# Patient Record
Sex: FEMALE | Race: WHITE | NOT HISPANIC OR LATINO
[De-identification: names, ages, dates, MRNs, and addresses within clinical notes are randomized per-mention and may not be internally consistent; named-entity substitution may affect disease eponyms.]

---

## 2019-07-12 ENCOUNTER — APPOINTMENT (OUTPATIENT)
Dept: INTERNAL MEDICINE | Facility: CLINIC | Age: 74
End: 2019-07-12
Payer: MEDICARE

## 2019-07-12 ENCOUNTER — FORM ENCOUNTER (OUTPATIENT)
Age: 74
End: 2019-07-12

## 2019-07-12 ENCOUNTER — NON-APPOINTMENT (OUTPATIENT)
Age: 74
End: 2019-07-12

## 2019-07-12 VITALS
SYSTOLIC BLOOD PRESSURE: 132 MMHG | TEMPERATURE: 97.8 F | OXYGEN SATURATION: 98 % | DIASTOLIC BLOOD PRESSURE: 78 MMHG | HEIGHT: 63 IN | BODY MASS INDEX: 17.36 KG/M2 | WEIGHT: 98 LBS | HEART RATE: 98 BPM

## 2019-07-12 DIAGNOSIS — Z78.9 OTHER SPECIFIED HEALTH STATUS: ICD-10-CM

## 2019-07-12 DIAGNOSIS — G89.29 EPIGASTRIC PAIN: ICD-10-CM

## 2019-07-12 DIAGNOSIS — Z80.3 FAMILY HISTORY OF MALIGNANT NEOPLASM OF BREAST: ICD-10-CM

## 2019-07-12 DIAGNOSIS — Z86.79 PERSONAL HISTORY OF OTHER DISEASES OF THE CIRCULATORY SYSTEM: ICD-10-CM

## 2019-07-12 DIAGNOSIS — Z98.890 OTHER SPECIFIED POSTPROCEDURAL STATES: ICD-10-CM

## 2019-07-12 DIAGNOSIS — R10.13 EPIGASTRIC PAIN: ICD-10-CM

## 2019-07-12 DIAGNOSIS — L71.9 ROSACEA, UNSPECIFIED: ICD-10-CM

## 2019-07-12 DIAGNOSIS — Z87.898 PERSONAL HISTORY OF OTHER SPECIFIED CONDITIONS: ICD-10-CM

## 2019-07-12 DIAGNOSIS — Z92.89 PERSONAL HISTORY OF OTHER MEDICAL TREATMENT: ICD-10-CM

## 2019-07-12 PROCEDURE — 93000 ELECTROCARDIOGRAM COMPLETE: CPT

## 2019-07-12 PROCEDURE — G0439: CPT

## 2019-07-12 PROCEDURE — G0444 DEPRESSION SCREEN ANNUAL: CPT | Mod: 59

## 2019-07-12 RX ORDER — CA/D3/MAG OX/ZINC/COP/MANG/BOR 600 MG-800
250 MCG TABLET,CHEWABLE ORAL
Refills: 0 | Status: ACTIVE | COMMUNITY

## 2019-07-12 RX ORDER — BIOTIN 10 MG
10000 TABLET ORAL DAILY
Refills: 0 | Status: ACTIVE | COMMUNITY

## 2019-07-12 RX ORDER — LUTEIN 6 MG
TABLET ORAL
Refills: 0 | Status: ACTIVE | COMMUNITY

## 2019-07-12 NOTE — HISTORY OF PRESENT ILLNESS
[de-identified] : annual exam\par - previous PMD resigned b/c of medical reasons\par \par HLD\par - compliant w/ medications\par - no side effects\par \par Chronic epigastric pain\par - non raditing, no association w/ food\par - no N/V\par - a/w loss of appetite and weight loss\par - can only tolerate liquids and jello\par - never had EGD\par \par acute constipation\par - no BM for the past 4 days\par - a/w nausea and loss of appetite\par - a/w change in stool caliber and size.\par \par HCM\par - up to date. [FreeTextEntry1] : annual exam

## 2019-07-12 NOTE — REVIEW OF SYSTEMS
[Abdominal Pain] : abdominal pain [Nausea] : nausea [Constipation] : constipation [Vomiting] : no vomiting [Negative] : Psychiatric

## 2019-07-12 NOTE — PHYSICAL EXAM
[No Acute Distress] : no acute distress [Well Nourished] : well nourished [Well Developed] : well developed [Well-Appearing] : well-appearing [Normal Sclera/Conjunctiva] : normal sclera/conjunctiva [EOMI] : extraocular movements intact [Normal Outer Ear/Nose] : the outer ears and nose were normal in appearance [Supple] : supple [No Lymphadenopathy] : no lymphadenopathy [Thyroid Normal, No Nodules] : the thyroid was normal and there were no nodules present [No Respiratory Distress] : no respiratory distress  [No Accessory Muscle Use] : no accessory muscle use [Clear to Auscultation] : lungs were clear to auscultation bilaterally [Normal Rate] : normal rate  [Regular Rhythm] : with a regular rhythm [Normal S1, S2] : normal S1 and S2 [No Murmur] : no murmur heard [No Varicosities] : no varicosities [No Edema] : there was no peripheral edema [No Palpable Aorta] : no palpable aorta [Non Tender] : non-tender [Soft] : abdomen soft [Non-distended] : non-distended [No Masses] : no abdominal mass palpated [No HSM] : no HSM [Normal Posterior Cervical Nodes] : no posterior cervical lymphadenopathy [Normal Anterior Cervical Nodes] : no anterior cervical lymphadenopathy [No CVA Tenderness] : no CVA  tenderness [No Joint Swelling] : no joint swelling [No Rash] : no rash [Coordination Grossly Intact] : coordination grossly intact [No Focal Deficits] : no focal deficits [Normal Gait] : normal gait [Normal Affect] : the affect was normal [Alert and Oriented x3] : oriented to person, place, and time [Normal Mood] : the mood was normal [Normal Insight/Judgement] : insight and judgment were intact

## 2019-07-12 NOTE — HEALTH RISK ASSESSMENT
[Excellent] : ~his/her~  mood as  excellent [] : No [Yes] : Yes [0] : 2) Feeling down, depressed, or hopeless: Not at all (0) [Change in mental status noted] : No change in mental status noted [[id number]] : 0 [Language] : denies difficulty with language [Behavior] : denies difficulty with behavior [Learning/Retaining New Information] : denies difficulty learning/retaining new information [Handling Complex Tasks] : denies difficulty handling complex tasks [Reasoning] : denies difficulty with reasoning [Spatial Ability and Orientation] : denies difficulty with spatial ability and orientation [None] : None [Alone] : lives alone [Single] : single [Sexually Active] : not sexually active [Fully functional (using the telephone, shopping, preparing meals, housekeeping, doing laundry, using] : Fully functional and needs no help or supervision to perform IADLs (using the telephone, shopping, preparing meals, housekeeping, doing laundry, using transportation, managing medications and managing finances) [Fully functional (bathing, dressing, toileting, transferring, walking, feeding)] : Fully functional (bathing, dressing, toileting, transferring, walking, feeding) [Reports changes in hearing] : Reports no changes in hearing [Reports changes in vision] : Reports no changes in vision [With Patient/Caregiver] : With Patient/Caregiver [AdvancecareDate] : 07/12/2019

## 2019-07-13 ENCOUNTER — APPOINTMENT (OUTPATIENT)
Dept: CT IMAGING | Facility: HOSPITAL | Age: 74
End: 2019-07-13
Payer: MEDICARE

## 2019-07-13 ENCOUNTER — OUTPATIENT (OUTPATIENT)
Dept: OUTPATIENT SERVICES | Facility: HOSPITAL | Age: 74
LOS: 1 days | End: 2019-07-13
Payer: MEDICARE

## 2019-07-13 ENCOUNTER — TRANSCRIPTION ENCOUNTER (OUTPATIENT)
Age: 74
End: 2019-07-13

## 2019-07-13 PROCEDURE — 74177 CT ABD & PELVIS W/CONTRAST: CPT | Mod: 26

## 2019-07-13 PROCEDURE — 74177 CT ABD & PELVIS W/CONTRAST: CPT

## 2019-07-16 LAB
25(OH)D3 SERPL-MCNC: 37.9 NG/ML
ALBUMIN SERPL ELPH-MCNC: 4.2 G/DL
ALP BLD-CCNC: 71 U/L
ALT SERPL-CCNC: 11 U/L
AMYLASE/CREAT SERPL: 42 U/L
ANION GAP SERPL CALC-SCNC: 17 MMOL/L
APPEARANCE: CLEAR
AST SERPL-CCNC: 13 U/L
BASOPHILS # BLD AUTO: 0.09 K/UL
BASOPHILS NFR BLD AUTO: 1.2 %
BILIRUB SERPL-MCNC: 0.4 MG/DL
BILIRUBIN URINE: NEGATIVE
BLOOD URINE: NEGATIVE
BUN SERPL-MCNC: 8 MG/DL
CALCIUM SERPL-MCNC: 9.8 MG/DL
CHLORIDE SERPL-SCNC: 99 MMOL/L
CHOLEST SERPL-MCNC: 104 MG/DL
CHOLEST/HDLC SERPL: 2.5 RATIO
CO2 SERPL-SCNC: 23 MMOL/L
COLOR: YELLOW
CREAT SERPL-MCNC: 0.57 MG/DL
CRP SERPL-MCNC: 2.3 MG/DL
EOSINOPHIL # BLD AUTO: 0.08 K/UL
EOSINOPHIL NFR BLD AUTO: 1 %
ESTIMATED AVERAGE GLUCOSE: 117 MG/DL
GLUCOSE QUALITATIVE U: NEGATIVE
GLUCOSE SERPL-MCNC: 94 MG/DL
HBA1C MFR BLD HPLC: 5.7 %
HCT VFR BLD CALC: 39.2 %
HCV AB SER QL: NONREACTIVE
HCV S/CO RATIO: 0.16 S/CO
HDLC SERPL-MCNC: 42 MG/DL
HGB BLD-MCNC: 12 G/DL
IMM GRANULOCYTES NFR BLD AUTO: 0.4 %
KETONES URINE: ABNORMAL
LDLC SERPL CALC-MCNC: 47 MG/DL
LEUKOCYTE ESTERASE URINE: NEGATIVE
LPL SERPL-CCNC: 18 U/L
LYMPHOCYTES # BLD AUTO: 1.28 K/UL
LYMPHOCYTES NFR BLD AUTO: 16.7 %
MAN DIFF?: NORMAL
MCHC RBC-ENTMCNC: 28.5 PG
MCHC RBC-ENTMCNC: 30.6 GM/DL
MCV RBC AUTO: 93.1 FL
MONOCYTES # BLD AUTO: 0.85 K/UL
MONOCYTES NFR BLD AUTO: 11.1 %
NEUTROPHILS # BLD AUTO: 5.33 K/UL
NEUTROPHILS NFR BLD AUTO: 69.6 %
NITRITE URINE: NEGATIVE
PH URINE: 6.5
PLATELET # BLD AUTO: 387 K/UL
POTASSIUM SERPL-SCNC: 4.4 MMOL/L
PROT SERPL-MCNC: 6.5 G/DL
PROTEIN URINE: NORMAL
RBC # BLD: 4.21 M/UL
RBC # FLD: 12.8 %
SODIUM SERPL-SCNC: 139 MMOL/L
SPECIFIC GRAVITY URINE: 1.02
T4 FREE SERPL-MCNC: 1.8 NG/DL
TRIGL SERPL-MCNC: 75 MG/DL
TSH SERPL-ACNC: 1.89 UIU/ML
UROBILINOGEN URINE: NORMAL
WBC # FLD AUTO: 7.66 K/UL

## 2019-10-23 ENCOUNTER — APPOINTMENT (OUTPATIENT)
Dept: INTERNAL MEDICINE | Facility: CLINIC | Age: 74
End: 2019-10-23
Payer: MEDICARE

## 2019-10-23 VITALS
TEMPERATURE: 97.4 F | HEIGHT: 63 IN | BODY MASS INDEX: 18.61 KG/M2 | SYSTOLIC BLOOD PRESSURE: 130 MMHG | DIASTOLIC BLOOD PRESSURE: 76 MMHG | HEART RATE: 94 BPM | WEIGHT: 105 LBS | OXYGEN SATURATION: 100 %

## 2019-10-23 DIAGNOSIS — Z23 ENCOUNTER FOR IMMUNIZATION: ICD-10-CM

## 2019-10-23 PROCEDURE — 90662 IIV NO PRSV INCREASED AG IM: CPT

## 2019-10-23 PROCEDURE — G0008: CPT

## 2019-11-01 ENCOUNTER — FORM ENCOUNTER (OUTPATIENT)
Age: 74
End: 2019-11-01

## 2019-11-02 ENCOUNTER — OUTPATIENT (OUTPATIENT)
Dept: OUTPATIENT SERVICES | Facility: HOSPITAL | Age: 74
LOS: 1 days | End: 2019-11-02
Payer: MEDICARE

## 2019-11-02 ENCOUNTER — APPOINTMENT (OUTPATIENT)
Dept: CT IMAGING | Facility: HOSPITAL | Age: 74
End: 2019-11-02
Payer: MEDICARE

## 2019-11-02 PROCEDURE — 74177 CT ABD & PELVIS W/CONTRAST: CPT | Mod: 26

## 2019-11-02 PROCEDURE — 74177 CT ABD & PELVIS W/CONTRAST: CPT

## 2019-12-30 ENCOUNTER — APPOINTMENT (OUTPATIENT)
Dept: OBGYN | Facility: CLINIC | Age: 74
End: 2019-12-30
Payer: MEDICARE

## 2019-12-30 VITALS
WEIGHT: 105 LBS | DIASTOLIC BLOOD PRESSURE: 80 MMHG | SYSTOLIC BLOOD PRESSURE: 140 MMHG | BODY MASS INDEX: 18.61 KG/M2 | HEIGHT: 63 IN

## 2019-12-30 PROCEDURE — 36415 COLL VENOUS BLD VENIPUNCTURE: CPT

## 2019-12-30 PROCEDURE — G0101: CPT

## 2019-12-30 NOTE — HISTORY OF PRESENT ILLNESS
[Currently In Menopause] : currently in menopause [Experiencing Menopausal Sxs] : experiencing menopausal symptoms [1 Year Ago] : 1 year ago [Good] : being in good health [Healthy Diet] : a healthy diet [Regular Exercise] : regular exercise [Weight Concerns] : no concerns with her weight [Menstrual Problems] : reports normal menses [Up to Date] : up to date with ~his/her~ STD screening

## 2019-12-30 NOTE — PHYSICAL EXAM
[Normal] : vagina [No Bleeding] : there was no active vaginal bleeding [Uterine Adnexae] : were not tender and not enlarged None known

## 2019-12-31 LAB
BASOPHILS # BLD AUTO: 0.05 K/UL
BASOPHILS NFR BLD AUTO: 0.7 %
CANCER AG125 SERPL-ACNC: 6 U/ML
EOSINOPHIL # BLD AUTO: 0.05 K/UL
EOSINOPHIL NFR BLD AUTO: 0.7 %
HCT VFR BLD CALC: 38.4 %
HGB BLD-MCNC: 11.9 G/DL
IMM GRANULOCYTES NFR BLD AUTO: 0.7 %
LYMPHOCYTES # BLD AUTO: 1.29 K/UL
LYMPHOCYTES NFR BLD AUTO: 19 %
MAN DIFF?: NORMAL
MCHC RBC-ENTMCNC: 28.6 PG
MCHC RBC-ENTMCNC: 31 GM/DL
MCV RBC AUTO: 92.3 FL
MONOCYTES # BLD AUTO: 0.55 K/UL
MONOCYTES NFR BLD AUTO: 8.1 %
NEUTROPHILS # BLD AUTO: 4.81 K/UL
NEUTROPHILS NFR BLD AUTO: 70.8 %
PLATELET # BLD AUTO: 324 K/UL
RBC # BLD: 4.16 M/UL
RBC # FLD: 13.8 %
WBC # FLD AUTO: 6.8 K/UL

## 2020-01-02 LAB — CYTOLOGY CVX/VAG DOC THIN PREP: ABNORMAL

## 2020-01-10 ENCOUNTER — RX RENEWAL (OUTPATIENT)
Age: 75
End: 2020-01-10

## 2020-08-14 ENCOUNTER — APPOINTMENT (OUTPATIENT)
Dept: INTERNAL MEDICINE | Facility: CLINIC | Age: 75
End: 2020-08-14
Payer: MEDICARE

## 2020-08-14 VITALS
HEART RATE: 92 BPM | TEMPERATURE: 98.2 F | OXYGEN SATURATION: 98 % | WEIGHT: 105 LBS | DIASTOLIC BLOOD PRESSURE: 60 MMHG | SYSTOLIC BLOOD PRESSURE: 120 MMHG | BODY MASS INDEX: 18.61 KG/M2 | HEIGHT: 63 IN

## 2020-08-14 DIAGNOSIS — Z11.59 ENCOUNTER FOR SCREENING FOR OTHER VIRAL DISEASES: ICD-10-CM

## 2020-08-14 DIAGNOSIS — Z23 ENCOUNTER FOR IMMUNIZATION: ICD-10-CM

## 2020-08-14 PROCEDURE — G0009: CPT

## 2020-08-14 PROCEDURE — G0444 DEPRESSION SCREEN ANNUAL: CPT | Mod: 59

## 2020-08-14 PROCEDURE — G0439: CPT

## 2020-08-14 PROCEDURE — 36415 COLL VENOUS BLD VENIPUNCTURE: CPT

## 2020-08-14 PROCEDURE — 90670 PCV13 VACCINE IM: CPT

## 2020-08-14 RX ORDER — ATORVASTATIN CALCIUM 10 MG/1
10 TABLET, FILM COATED ORAL
Qty: 90 | Refills: 3 | Status: COMPLETED | COMMUNITY
End: 2020-08-14

## 2020-08-14 NOTE — HISTORY OF PRESENT ILLNESS
[de-identified] : annual\par - doing well over all\par - has not left house much b/c of covid\par - has had a few panic attacks, pandemic has made her very anxious\par \par HLD\par - compliant w/ medication\par \par HCM\par - needs pna13 vax\par - missed mammo b/c of pandemic [FreeTextEntry1] : annual

## 2020-08-14 NOTE — HEALTH RISK ASSESSMENT
[None] : None [With Family] : lives with family [Employed] : employed [Single] : single [Fully functional (using the telephone, shopping, preparing meals, housekeeping, doing laundry, using] : Fully functional and needs no help or supervision to perform IADLs (using the telephone, shopping, preparing meals, housekeeping, doing laundry, using transportation, managing medications and managing finances) [Fully functional (bathing, dressing, toileting, transferring, walking, feeding)] : Fully functional (bathing, dressing, toileting, transferring, walking, feeding)

## 2020-08-14 NOTE — PHYSICAL EXAM
[Well Nourished] : well nourished [No Acute Distress] : no acute distress [Well Developed] : well developed [Normal Sclera/Conjunctiva] : normal sclera/conjunctiva [Well-Appearing] : well-appearing [EOMI] : extraocular movements intact [Normal Outer Ear/Nose] : the outer ears and nose were normal in appearance [Supple] : supple [No Lymphadenopathy] : no lymphadenopathy [Thyroid Normal, No Nodules] : the thyroid was normal and there were no nodules present [No Respiratory Distress] : no respiratory distress  [No Accessory Muscle Use] : no accessory muscle use [Normal Rate] : normal rate  [Clear to Auscultation] : lungs were clear to auscultation bilaterally [Regular Rhythm] : with a regular rhythm [No Murmur] : no murmur heard [Normal S1, S2] : normal S1 and S2 [No Varicosities] : no varicosities [No Palpable Aorta] : no palpable aorta [Soft] : abdomen soft [Non Tender] : non-tender [Non-distended] : non-distended [No HSM] : no HSM [No Masses] : no abdominal mass palpated [Normal Supraclavicular Nodes] : no supraclavicular lymphadenopathy [Normal Anterior Cervical Nodes] : no anterior cervical lymphadenopathy [No CVA Tenderness] : no CVA  tenderness [No Spinal Tenderness] : no spinal tenderness [No Rash] : no rash [Grossly Normal Strength/Tone] : grossly normal strength/tone [No Joint Swelling] : no joint swelling [No Focal Deficits] : no focal deficits [Coordination Grossly Intact] : coordination grossly intact [Normal Gait] : normal gait [Normal Affect] : the affect was normal [Alert and Oriented x3] : oriented to person, place, and time [Normal Mood] : the mood was normal [Normal Insight/Judgement] : insight and judgment were intact [de-identified] : b/l LE pedal edema

## 2020-08-19 ENCOUNTER — TRANSCRIPTION ENCOUNTER (OUTPATIENT)
Age: 75
End: 2020-08-19

## 2020-08-19 LAB
25(OH)D3 SERPL-MCNC: 40.5 NG/ML
ALBUMIN SERPL ELPH-MCNC: 5.1 G/DL
ALP BLD-CCNC: 68 U/L
ALT SERPL-CCNC: 22 U/L
ANION GAP SERPL CALC-SCNC: 13 MMOL/L
APPEARANCE: CLEAR
AST SERPL-CCNC: 22 U/L
BASOPHILS # BLD AUTO: 0.06 K/UL
BASOPHILS NFR BLD AUTO: 1.1 %
BILIRUB SERPL-MCNC: 0.6 MG/DL
BILIRUBIN URINE: NEGATIVE
BLOOD URINE: NEGATIVE
BUN SERPL-MCNC: 13 MG/DL
CALCIUM SERPL-MCNC: 10.2 MG/DL
CHLORIDE SERPL-SCNC: 105 MMOL/L
CHOLEST SERPL-MCNC: 148 MG/DL
CHOLEST/HDLC SERPL: 2.1 RATIO
CO2 SERPL-SCNC: 24 MMOL/L
COLOR: NORMAL
CREAT SERPL-MCNC: 0.58 MG/DL
EOSINOPHIL # BLD AUTO: 0.04 K/UL
EOSINOPHIL NFR BLD AUTO: 0.7 %
ESTIMATED AVERAGE GLUCOSE: 108 MG/DL
GLUCOSE QUALITATIVE U: NEGATIVE
GLUCOSE SERPL-MCNC: 101 MG/DL
HBA1C MFR BLD HPLC: 5.4 %
HCT VFR BLD CALC: 38.7 %
HDLC SERPL-MCNC: 71 MG/DL
HGB BLD-MCNC: 12.4 G/DL
IMM GRANULOCYTES NFR BLD AUTO: 0.4 %
KETONES URINE: NEGATIVE
LDLC SERPL CALC-MCNC: 67 MG/DL
LEUKOCYTE ESTERASE URINE: NEGATIVE
LYMPHOCYTES # BLD AUTO: 1.36 K/UL
LYMPHOCYTES NFR BLD AUTO: 24 %
MAN DIFF?: NORMAL
MCHC RBC-ENTMCNC: 29.9 PG
MCHC RBC-ENTMCNC: 32 GM/DL
MCV RBC AUTO: 93.3 FL
MONOCYTES # BLD AUTO: 0.31 K/UL
MONOCYTES NFR BLD AUTO: 5.5 %
NEUTROPHILS # BLD AUTO: 3.88 K/UL
NEUTROPHILS NFR BLD AUTO: 68.3 %
NITRITE URINE: NEGATIVE
PH URINE: 6.5
PLATELET # BLD AUTO: 212 K/UL
POTASSIUM SERPL-SCNC: 4.4 MMOL/L
PROT SERPL-MCNC: 7.3 G/DL
PROTEIN URINE: NEGATIVE
RBC # BLD: 4.15 M/UL
RBC # FLD: 12.9 %
SARS-COV-2 IGG SERPL IA-ACNC: 0.08 INDEX
SARS-COV-2 IGG SERPL QL IA: NEGATIVE
SODIUM SERPL-SCNC: 142 MMOL/L
SPECIFIC GRAVITY URINE: 1.01
T4 FREE SERPL-MCNC: 1.4 NG/DL
TRIGL SERPL-MCNC: 53 MG/DL
TSH SERPL-ACNC: 2 UIU/ML
UROBILINOGEN URINE: NORMAL
WBC # FLD AUTO: 5.67 K/UL

## 2020-09-11 ENCOUNTER — APPOINTMENT (OUTPATIENT)
Dept: INTERNAL MEDICINE | Facility: CLINIC | Age: 75
End: 2020-09-11
Payer: MEDICARE

## 2020-09-11 VITALS
OXYGEN SATURATION: 100 % | SYSTOLIC BLOOD PRESSURE: 135 MMHG | TEMPERATURE: 97.5 F | DIASTOLIC BLOOD PRESSURE: 72 MMHG | HEART RATE: 93 BPM

## 2020-09-11 DIAGNOSIS — Z23 ENCOUNTER FOR IMMUNIZATION: ICD-10-CM

## 2020-09-11 PROCEDURE — G0008: CPT

## 2020-09-11 PROCEDURE — 90662 IIV NO PRSV INCREASED AG IM: CPT

## 2021-05-04 ENCOUNTER — NON-APPOINTMENT (OUTPATIENT)
Age: 76
End: 2021-05-04

## 2021-05-04 ENCOUNTER — APPOINTMENT (OUTPATIENT)
Dept: INTERNAL MEDICINE | Facility: CLINIC | Age: 76
End: 2021-05-04
Payer: MEDICARE

## 2021-05-04 VITALS
HEIGHT: 65 IN | HEART RATE: 87 BPM | OXYGEN SATURATION: 97 % | WEIGHT: 112 LBS | DIASTOLIC BLOOD PRESSURE: 77 MMHG | BODY MASS INDEX: 18.66 KG/M2 | TEMPERATURE: 97.8 F | SYSTOLIC BLOOD PRESSURE: 152 MMHG

## 2021-05-04 DIAGNOSIS — R59.0 LOCALIZED ENLARGED LYMPH NODES: ICD-10-CM

## 2021-05-04 PROCEDURE — 99213 OFFICE O/P EST LOW 20 MIN: CPT

## 2021-05-04 NOTE — HISTORY OF PRESENT ILLNESS
[FreeTextEntry1] : follow up [de-identified] : follow up\par - doing well over all\par - completed covid vax sereies\par \par HTN\par - BP elevated\par - today is first day in public space since pandemic started.\par - she is very anxious. \par \par HCM\par - did not have mammo recently b/c of pandemic\par - will f/u w/ GYN

## 2021-07-28 ENCOUNTER — RX RENEWAL (OUTPATIENT)
Age: 76
End: 2021-07-28

## 2021-10-21 ENCOUNTER — RX RENEWAL (OUTPATIENT)
Age: 76
End: 2021-10-21

## 2021-12-07 ENCOUNTER — NON-APPOINTMENT (OUTPATIENT)
Age: 76
End: 2021-12-07

## 2021-12-07 ENCOUNTER — APPOINTMENT (OUTPATIENT)
Dept: OBGYN | Facility: CLINIC | Age: 76
End: 2021-12-07
Payer: MEDICARE

## 2021-12-07 VITALS — SYSTOLIC BLOOD PRESSURE: 140 MMHG | WEIGHT: 109 LBS | DIASTOLIC BLOOD PRESSURE: 80 MMHG | BODY MASS INDEX: 18.14 KG/M2

## 2021-12-07 PROCEDURE — G0101: CPT

## 2021-12-07 NOTE — HISTORY OF PRESENT ILLNESS
[Patient reported mammogram was normal] : Patient reported mammogram was normal [Patient reported PAP Smear was normal] : Patient reported PAP Smear was normal [Currently In Menopause] : currently in menopause [Post-Menopause, No Sxs] : post-menopausal, currently without symptoms [Mammogramdate] : 12/18/2018 [PapSmeardate] : 12/30/2019 [ColonoscopyDate] : 3/13/2017 [Previously active] : previously active

## 2021-12-09 LAB — CYTOLOGY CVX/VAG DOC THIN PREP: ABNORMAL

## 2022-02-02 ENCOUNTER — APPOINTMENT (OUTPATIENT)
Dept: INTERNAL MEDICINE | Facility: CLINIC | Age: 77
End: 2022-02-02
Payer: MEDICARE

## 2022-02-02 ENCOUNTER — NON-APPOINTMENT (OUTPATIENT)
Age: 77
End: 2022-02-02

## 2022-02-02 VITALS
TEMPERATURE: 97.6 F | SYSTOLIC BLOOD PRESSURE: 149 MMHG | DIASTOLIC BLOOD PRESSURE: 71 MMHG | HEART RATE: 99 BPM | BODY MASS INDEX: 18.64 KG/M2 | WEIGHT: 112 LBS | OXYGEN SATURATION: 99 %

## 2022-02-02 DIAGNOSIS — R21 RASH AND OTHER NONSPECIFIC SKIN ERUPTION: ICD-10-CM

## 2022-02-02 DIAGNOSIS — H26.9 UNSPECIFIED CATARACT: ICD-10-CM

## 2022-02-02 PROCEDURE — 93000 ELECTROCARDIOGRAM COMPLETE: CPT

## 2022-02-02 PROCEDURE — 36415 COLL VENOUS BLD VENIPUNCTURE: CPT

## 2022-02-02 PROCEDURE — G0439: CPT

## 2022-02-02 NOTE — PHYSICAL EXAM
[Normal] : affect was normal and insight and judgment were intact [de-identified] : left anterior shin flat macular red lesion, 2inch irregular boarder

## 2022-02-02 NOTE — HISTORY OF PRESENT ILLNESS
[FreeTextEntry1] : annual exam [de-identified] : annual\par - doing well over all\par \par h/o MVP\par - has not seen cardio in years\par \par left shin rash\par - present for 2 weeks\par - not painful or pruritic\par - would like derm eval\par - does not see one for rosacea\par \par HCM\par - mammo due this week\par - c scope due this year

## 2022-02-09 ENCOUNTER — TRANSCRIPTION ENCOUNTER (OUTPATIENT)
Age: 77
End: 2022-02-09

## 2022-02-09 LAB
25(OH)D3 SERPL-MCNC: 37.7 NG/ML
ALBUMIN SERPL ELPH-MCNC: 5 G/DL
ALP BLD-CCNC: 69 U/L
ALT SERPL-CCNC: 19 U/L
ANION GAP SERPL CALC-SCNC: 14 MMOL/L
APPEARANCE: CLEAR
AST SERPL-CCNC: 19 U/L
BASOPHILS # BLD AUTO: 0.06 K/UL
BASOPHILS NFR BLD AUTO: 0.8 %
BILIRUB SERPL-MCNC: 0.6 MG/DL
BILIRUBIN URINE: NEGATIVE
BLOOD URINE: NEGATIVE
BUN SERPL-MCNC: 13 MG/DL
CALCIUM SERPL-MCNC: 10.1 MG/DL
CHLORIDE SERPL-SCNC: 104 MMOL/L
CHOLEST SERPL-MCNC: 149 MG/DL
CO2 SERPL-SCNC: 22 MMOL/L
COLOR: NORMAL
CREAT SERPL-MCNC: 0.65 MG/DL
EOSINOPHIL # BLD AUTO: 0.03 K/UL
EOSINOPHIL NFR BLD AUTO: 0.4 %
ESTIMATED AVERAGE GLUCOSE: 108 MG/DL
GLUCOSE QUALITATIVE U: NEGATIVE
GLUCOSE SERPL-MCNC: 99 MG/DL
HBA1C MFR BLD HPLC: 5.4 %
HCT VFR BLD CALC: 38.5 %
HDLC SERPL-MCNC: 76 MG/DL
HGB BLD-MCNC: 12.1 G/DL
IMM GRANULOCYTES NFR BLD AUTO: 0.4 %
KETONES URINE: NEGATIVE
LDLC SERPL CALC-MCNC: 63 MG/DL
LEUKOCYTE ESTERASE URINE: NEGATIVE
LYMPHOCYTES # BLD AUTO: 1.15 K/UL
LYMPHOCYTES NFR BLD AUTO: 14.6 %
MAN DIFF?: NORMAL
MCHC RBC-ENTMCNC: 29.2 PG
MCHC RBC-ENTMCNC: 31.4 GM/DL
MCV RBC AUTO: 92.8 FL
MONOCYTES # BLD AUTO: 0.44 K/UL
MONOCYTES NFR BLD AUTO: 5.6 %
NEUTROPHILS # BLD AUTO: 6.19 K/UL
NEUTROPHILS NFR BLD AUTO: 78.2 %
NITRITE URINE: NEGATIVE
NONHDLC SERPL-MCNC: 72 MG/DL
PH URINE: 7
PLATELET # BLD AUTO: 222 K/UL
POTASSIUM SERPL-SCNC: 4.3 MMOL/L
PROT SERPL-MCNC: 6.8 G/DL
PROTEIN URINE: NEGATIVE
RBC # BLD: 4.15 M/UL
RBC # FLD: 13.2 %
SODIUM SERPL-SCNC: 141 MMOL/L
SPECIFIC GRAVITY URINE: 1.01
TRIGL SERPL-MCNC: 48 MG/DL
TSH SERPL-ACNC: 1.85 UIU/ML
UROBILINOGEN URINE: NORMAL
WBC # FLD AUTO: 7.9 K/UL

## 2022-05-16 ENCOUNTER — APPOINTMENT (OUTPATIENT)
Dept: HEART AND VASCULAR | Facility: CLINIC | Age: 77
End: 2022-05-16
Payer: MEDICARE

## 2022-05-16 VITALS
DIASTOLIC BLOOD PRESSURE: 68 MMHG | TEMPERATURE: 98.2 F | SYSTOLIC BLOOD PRESSURE: 150 MMHG | HEART RATE: 109 BPM | BODY MASS INDEX: 21.14 KG/M2 | WEIGHT: 112 LBS | HEIGHT: 61 IN | OXYGEN SATURATION: 98 %

## 2022-05-16 PROCEDURE — 93000 ELECTROCARDIOGRAM COMPLETE: CPT

## 2022-05-16 PROCEDURE — 99204 OFFICE O/P NEW MOD 45 MIN: CPT | Mod: 25

## 2022-05-17 NOTE — PHYSICAL EXAM
[Well Developed] : well developed [Well Nourished] : well nourished [No Acute Distress] : no acute distress [Normal Conjunctiva] : normal conjunctiva [Normal Venous Pressure] : normal venous pressure [No Carotid Bruit] : no carotid bruit [Normal S1, S2] : normal S1, S2 [No Murmur] : no murmur [No Rub] : no rub [No Gallop] : no gallop [Clear Lung Fields] : clear lung fields [Good Air Entry] : good air entry [No Respiratory Distress] : no respiratory distress  [Soft] : abdomen soft [Non Tender] : non-tender [No Masses/organomegaly] : no masses/organomegaly [Normal Bowel Sounds] : normal bowel sounds [Normal Gait] : normal gait [No Edema] : no edema [No Cyanosis] : no cyanosis [No Clubbing] : no clubbing [No Varicosities] : no varicosities [Edema ___] : edema [unfilled] [No Rash] : no rash [No Skin Lesions] : no skin lesions [Moves all extremities] : moves all extremities [No Focal Deficits] : no focal deficits [Normal Speech] : normal speech [Alert and Oriented] : alert and oriented [Normal memory] : normal memory [de-identified] : Right DP 1+, left 2+

## 2022-05-17 NOTE — HISTORY OF PRESENT ILLNESS
[FreeTextEntry1] : The patient has a history of mitral valve prolapse.  She exercises for 30 minutes with weights without difficulty.  She also walks.  Her diet has been good.  She denies chest discomfort or dyspnea.

## 2022-05-17 NOTE — DISCUSSION/SUMMARY
[FreeTextEntry1] : The patient has a history of mitral prolapse.  She does moderate physical activity without difficulty.  Her blood pressure in the office is elevated.  Her blood pressure at home is 116 systolic.  This is good.  She has a new finding of venous stasis.  She will return for venous Doppler.  She also has new mild ST depression on her EKG.  The patient will undergo an echocardiogram.  Her cholesterol is excellent.  She will continue atorvastatin.

## 2022-06-24 ENCOUNTER — APPOINTMENT (OUTPATIENT)
Dept: HEART AND VASCULAR | Facility: CLINIC | Age: 77
End: 2022-06-24

## 2022-06-24 VITALS
SYSTOLIC BLOOD PRESSURE: 155 MMHG | OXYGEN SATURATION: 98 % | HEIGHT: 61 IN | BODY MASS INDEX: 20.77 KG/M2 | DIASTOLIC BLOOD PRESSURE: 69 MMHG | HEART RATE: 98 BPM | TEMPERATURE: 98 F | WEIGHT: 110 LBS

## 2022-06-24 DIAGNOSIS — I34.1 NONRHEUMATIC MITRAL (VALVE) PROLAPSE: ICD-10-CM

## 2022-06-24 DIAGNOSIS — R94.31 ABNORMAL ELECTROCARDIOGRAM [ECG] [EKG]: ICD-10-CM

## 2022-06-24 PROCEDURE — 93306 TTE W/DOPPLER COMPLETE: CPT

## 2022-07-01 PROBLEM — I34.1 MITRAL VALVE PROLAPSE: Status: ACTIVE | Noted: 2022-07-01

## 2022-07-01 PROBLEM — R94.31 ABNORMAL EKG: Status: ACTIVE | Noted: 2022-05-16

## 2022-07-13 ENCOUNTER — RX RENEWAL (OUTPATIENT)
Age: 77
End: 2022-07-13

## 2022-08-16 ENCOUNTER — APPOINTMENT (OUTPATIENT)
Dept: INTERNAL MEDICINE | Facility: CLINIC | Age: 77
End: 2022-08-16

## 2022-08-16 VITALS
HEART RATE: 104 BPM | TEMPERATURE: 97.9 F | WEIGHT: 110 LBS | SYSTOLIC BLOOD PRESSURE: 143 MMHG | BODY MASS INDEX: 20.77 KG/M2 | OXYGEN SATURATION: 99 % | DIASTOLIC BLOOD PRESSURE: 76 MMHG | HEIGHT: 61 IN

## 2022-08-16 DIAGNOSIS — I10 ESSENTIAL (PRIMARY) HYPERTENSION: ICD-10-CM

## 2022-08-16 DIAGNOSIS — K59.00 CONSTIPATION, UNSPECIFIED: ICD-10-CM

## 2022-08-16 PROCEDURE — 99213 OFFICE O/P EST LOW 20 MIN: CPT | Mod: 25

## 2022-08-16 PROCEDURE — 36415 COLL VENOUS BLD VENIPUNCTURE: CPT

## 2022-08-17 LAB
ALBUMIN SERPL ELPH-MCNC: 5 G/DL
ALP BLD-CCNC: 71 U/L
ALT SERPL-CCNC: 21 U/L
ANION GAP SERPL CALC-SCNC: 13 MMOL/L
APPEARANCE: CLEAR
AST SERPL-CCNC: 20 U/L
BILIRUB SERPL-MCNC: 0.7 MG/DL
BILIRUBIN URINE: NEGATIVE
BLOOD URINE: NEGATIVE
BUN SERPL-MCNC: 14 MG/DL
CALCIUM SERPL-MCNC: 10.3 MG/DL
CHLORIDE SERPL-SCNC: 104 MMOL/L
CHOLEST SERPL-MCNC: 157 MG/DL
CO2 SERPL-SCNC: 25 MMOL/L
COLOR: NORMAL
CREAT SERPL-MCNC: 0.62 MG/DL
EGFR: 92 ML/MIN/1.73M2
GLUCOSE QUALITATIVE U: NEGATIVE
GLUCOSE SERPL-MCNC: 101 MG/DL
HDLC SERPL-MCNC: 79 MG/DL
KETONES URINE: NEGATIVE
LDLC SERPL CALC-MCNC: 67 MG/DL
LEUKOCYTE ESTERASE URINE: NEGATIVE
NITRITE URINE: NEGATIVE
NONHDLC SERPL-MCNC: 78 MG/DL
PH URINE: 7
POTASSIUM SERPL-SCNC: 4.8 MMOL/L
PROT SERPL-MCNC: 7.1 G/DL
PROTEIN URINE: NEGATIVE
SODIUM SERPL-SCNC: 141 MMOL/L
SPECIFIC GRAVITY URINE: 1.01
TRIGL SERPL-MCNC: 56 MG/DL
UROBILINOGEN URINE: NORMAL

## 2022-08-18 NOTE — HISTORY OF PRESENT ILLNESS
[FreeTextEntry1] :  6 mo f/u for HLD [de-identified] : Pt is a 76 y/o F with PMHx of HLD, osteoporosis who presents to the office today for 6 mo f/u for HLD\par \par States has not been as active secondary to being worried about danger outside "shootings and pandemic."  Has been doing light weight bearing activities at home but not walking as much as she used to\par \par BP:\par - 143/76 \par - Pt has home BP machine and states number is usual around 110 mmHg systolic\par \par Will be having a stress test on 8/30 with cardio\par

## 2022-08-18 NOTE — PHYSICAL EXAM
[Normal Sclera/Conjunctiva] : normal sclera/conjunctiva [No Edema] : there was no peripheral edema [Normal] : affect was normal and insight and judgment were intact [de-identified] : + erythema telengectasia over face, hyperpigmentation of LE b/l

## 2022-11-09 ENCOUNTER — APPOINTMENT (OUTPATIENT)
Dept: HEART AND VASCULAR | Facility: CLINIC | Age: 77
End: 2022-11-09

## 2023-05-09 ENCOUNTER — APPOINTMENT (OUTPATIENT)
Dept: INTERNAL MEDICINE | Facility: CLINIC | Age: 78
End: 2023-05-09
Payer: MEDICARE

## 2023-05-09 VITALS
TEMPERATURE: 97.9 F | RESPIRATION RATE: 20 BRPM | DIASTOLIC BLOOD PRESSURE: 70 MMHG | BODY MASS INDEX: 20.96 KG/M2 | HEIGHT: 61 IN | HEART RATE: 100 BPM | WEIGHT: 111 LBS | SYSTOLIC BLOOD PRESSURE: 138 MMHG | OXYGEN SATURATION: 99 %

## 2023-05-09 DIAGNOSIS — Z01.419 ENCOUNTER FOR GYNECOLOGICAL EXAMINATION (GENERAL) (ROUTINE) W/OUT ABNORMAL FINDINGS: ICD-10-CM

## 2023-05-09 DIAGNOSIS — F41.9 ANXIETY DISORDER, UNSPECIFIED: ICD-10-CM

## 2023-05-09 PROCEDURE — 36415 COLL VENOUS BLD VENIPUNCTURE: CPT

## 2023-05-09 PROCEDURE — 99213 OFFICE O/P EST LOW 20 MIN: CPT | Mod: 25

## 2023-05-09 NOTE — REVIEW OF SYSTEMS
[Unsteady Walking] : ataxia [Anxiety] : anxiety [Fever] : no fever [Chills] : no chills [Vision Problems] : no vision problems [Earache] : no earache [Hearing Loss] : no hearing loss [Sore Throat] : no sore throat [Chest Pain] : no chest pain [Palpitations] : no palpitations [Orthopnea] : no orthopnea [Shortness Of Breath] : no shortness of breath [Dyspnea on Exertion] : no dyspnea on exertion [Abdominal Pain] : no abdominal pain [Headache] : no headache [Dizziness] : no dizziness [Fainting] : no fainting [Confusion] : no confusion [Memory Loss] : no memory loss

## 2023-05-09 NOTE — HISTORY OF PRESENT ILLNESS
[FreeTextEntry8] : 79yo F PMH HLD who presents for anxiety when walking outside. Patient notes that during pandemic they stayed inside for almost two years. When the pandemic started to wane, she started going outside again but experienced severe anxiety just stepping outside her front door. So she took it slowly and very gradually started to walk further and further. However she continued to experience anxiety whenever she was walking outside. She notes she would start to shake, get a unilateral headache, feel weak and lightheaded and would feel like she would fall to the right. She was always able to maintain her balance though and wasn't actually falling to the right. However 2 weeks ago she was walking and tripped over an uneven sidewalk and landed on her right side. She continues to feel anxiety when walking outside, especially if there are steps and only feels a little better if there are walls to hang onto. She wants to make sure there aren't any medical issues causing her anxiety attacks.

## 2023-05-09 NOTE — END OF VISIT
[] : Resident [FreeTextEntry3] : - psychologist referral; home PT for balance training; hold off on SSRI for now\par - check annual labs and f/u in 4 weeks for Physcial

## 2023-05-09 NOTE — PHYSICAL EXAM
[No Acute Distress] : no acute distress [Normal Sclera/Conjunctiva] : normal sclera/conjunctiva [Normal Outer Ear/Nose] : the outer ears and nose were normal in appearance [Normal Oropharynx] : the oropharynx was normal [No JVD] : no jugular venous distention [No Lymphadenopathy] : no lymphadenopathy [Thyroid Normal, No Nodules] : the thyroid was normal and there were no nodules present [No Respiratory Distress] : no respiratory distress  [Clear to Auscultation] : lungs were clear to auscultation bilaterally [Normal Rate] : normal rate  [Regular Rhythm] : with a regular rhythm [Normal S1, S2] : normal S1 and S2 [No Murmur] : no murmur heard [No Edema] : there was no peripheral edema [No Spinal Tenderness] : no spinal tenderness [No Joint Swelling] : no joint swelling [Coordination Grossly Intact] : coordination grossly intact [No Focal Deficits] : no focal deficits [Normal Gait] : normal gait [Normal Affect] : the affect was normal [Normal Mood] : the mood was normal [Normal Insight/Judgement] : insight and judgment were intact [de-identified] : diffuse erythema of face around the lateral face and around jawline [de-identified] : b/l LE venous stasis dermatitis

## 2023-05-12 RX ORDER — UBIDECARENONE 100 MG
100 CAPSULE ORAL
Refills: 0 | Status: ACTIVE | COMMUNITY
Start: 2022-08-16

## 2023-05-17 ENCOUNTER — NON-APPOINTMENT (OUTPATIENT)
Age: 78
End: 2023-05-17

## 2023-05-17 LAB
25(OH)D3 SERPL-MCNC: 41.3 NG/ML
ALBUMIN SERPL ELPH-MCNC: 5 G/DL
ALP BLD-CCNC: 91 U/L
ALT SERPL-CCNC: 20 U/L
ANION GAP SERPL CALC-SCNC: 12 MMOL/L
APPEARANCE: CLEAR
AST SERPL-CCNC: 18 U/L
BACTERIA: NEGATIVE /HPF
BASOPHILS # BLD AUTO: 0.06 K/UL
BASOPHILS NFR BLD AUTO: 0.9 %
BILIRUB SERPL-MCNC: 0.6 MG/DL
BILIRUBIN URINE: NEGATIVE
BLOOD URINE: NEGATIVE
BUN SERPL-MCNC: 11 MG/DL
CALCIUM SERPL-MCNC: 10.6 MG/DL
CAST: 0 /LPF
CHLORIDE SERPL-SCNC: 102 MMOL/L
CHOLEST SERPL-MCNC: 150 MG/DL
CO2 SERPL-SCNC: 24 MMOL/L
COLOR: YELLOW
CREAT SERPL-MCNC: 0.61 MG/DL
EGFR: 92 ML/MIN/1.73M2
EOSINOPHIL # BLD AUTO: 0.03 K/UL
EOSINOPHIL NFR BLD AUTO: 0.4 %
EPITHELIAL CELLS: 0 /HPF
ESTIMATED AVERAGE GLUCOSE: 111 MG/DL
GLUCOSE QUALITATIVE U: NEGATIVE MG/DL
GLUCOSE SERPL-MCNC: 107 MG/DL
HBA1C MFR BLD HPLC: 5.5 %
HCT VFR BLD CALC: 39.9 %
HDLC SERPL-MCNC: 83 MG/DL
HGB BLD-MCNC: 12.7 G/DL
IMM GRANULOCYTES NFR BLD AUTO: 0.1 %
KETONES URINE: NEGATIVE MG/DL
LDLC SERPL CALC-MCNC: 58 MG/DL
LEUKOCYTE ESTERASE URINE: NEGATIVE
LYMPHOCYTES # BLD AUTO: 1.24 K/UL
LYMPHOCYTES NFR BLD AUTO: 17.6 %
MAN DIFF?: NORMAL
MCHC RBC-ENTMCNC: 29.5 PG
MCHC RBC-ENTMCNC: 31.8 GM/DL
MCV RBC AUTO: 92.8 FL
MICROSCOPIC-UA: NORMAL
MONOCYTES # BLD AUTO: 0.34 K/UL
MONOCYTES NFR BLD AUTO: 4.8 %
NEUTROPHILS # BLD AUTO: 5.35 K/UL
NEUTROPHILS NFR BLD AUTO: 76.2 %
NITRITE URINE: NEGATIVE
NONHDLC SERPL-MCNC: 67 MG/DL
PH URINE: 7
PLATELET # BLD AUTO: 243 K/UL
POTASSIUM SERPL-SCNC: 4.4 MMOL/L
PROT SERPL-MCNC: 7.3 G/DL
PROTEIN URINE: NEGATIVE MG/DL
RBC # BLD: 4.3 M/UL
RBC # FLD: 13.6 %
RED BLOOD CELLS URINE: 1 /HPF
SODIUM SERPL-SCNC: 138 MMOL/L
SPECIFIC GRAVITY URINE: 1.01
TRIGL SERPL-MCNC: 49 MG/DL
UROBILINOGEN URINE: 0.2 MG/DL
WBC # FLD AUTO: 7.03 K/UL
WHITE BLOOD CELLS URINE: 0 /HPF

## 2023-05-23 ENCOUNTER — TRANSCRIPTION ENCOUNTER (OUTPATIENT)
Age: 78
End: 2023-05-23

## 2023-06-01 ENCOUNTER — TRANSCRIPTION ENCOUNTER (OUTPATIENT)
Age: 78
End: 2023-06-01

## 2023-06-08 ENCOUNTER — TRANSCRIPTION ENCOUNTER (OUTPATIENT)
Age: 78
End: 2023-06-08

## 2023-06-09 ENCOUNTER — APPOINTMENT (OUTPATIENT)
Dept: INTERNAL MEDICINE | Facility: CLINIC | Age: 78
End: 2023-06-09
Payer: MEDICARE

## 2023-06-09 VITALS
HEIGHT: 61 IN | SYSTOLIC BLOOD PRESSURE: 143 MMHG | HEART RATE: 95 BPM | TEMPERATURE: 97.4 F | OXYGEN SATURATION: 98 % | WEIGHT: 113 LBS | BODY MASS INDEX: 21.34 KG/M2 | DIASTOLIC BLOOD PRESSURE: 70 MMHG

## 2023-06-09 DIAGNOSIS — R26.81 UNSTEADINESS ON FEET: ICD-10-CM

## 2023-06-09 PROCEDURE — G0444 DEPRESSION SCREEN ANNUAL: CPT

## 2023-06-09 PROCEDURE — G0439: CPT

## 2023-06-09 NOTE — PHYSICAL EXAM
[Normal Sclera/Conjunctiva] : normal sclera/conjunctiva [EOMI] : extraocular movements intact [No Edema] : there was no peripheral edema [Normal] : affect was normal and insight and judgment were intact [de-identified] : left anterior shin flat macular red lesion, 2inch irregular boarder

## 2023-06-09 NOTE — HISTORY OF PRESENT ILLNESS
[FreeTextEntry1] : annual [de-identified] : annual\par - doing great today\par \par unsteady gait\par - started PT; has had 2 sessions so far\par - feels amazing is very pleased with results\par - is more secure in walking\par \par anxiety\par - improved after PT; is less nervous about falling.\par \par HCM\par - c scope was due last year\par - needs new GI\par - pna vax completed\par - dexa done 2022

## 2023-07-10 ENCOUNTER — RX RENEWAL (OUTPATIENT)
Age: 78
End: 2023-07-10

## 2023-09-13 ENCOUNTER — APPOINTMENT (OUTPATIENT)
Dept: MAMMOGRAPHY | Facility: CLINIC | Age: 78
End: 2023-09-13

## 2023-10-18 ENCOUNTER — APPOINTMENT (OUTPATIENT)
Dept: INTERNAL MEDICINE | Facility: CLINIC | Age: 78
End: 2023-10-18
Payer: MEDICARE

## 2023-10-18 ENCOUNTER — LABORATORY RESULT (OUTPATIENT)
Age: 78
End: 2023-10-18

## 2023-10-18 VITALS — SYSTOLIC BLOOD PRESSURE: 123 MMHG | DIASTOLIC BLOOD PRESSURE: 63 MMHG

## 2023-10-18 VITALS
DIASTOLIC BLOOD PRESSURE: 78 MMHG | WEIGHT: 112 LBS | BODY MASS INDEX: 21.14 KG/M2 | RESPIRATION RATE: 20 BRPM | OXYGEN SATURATION: 98 % | HEIGHT: 61 IN | HEART RATE: 100 BPM | SYSTOLIC BLOOD PRESSURE: 149 MMHG

## 2023-10-18 DIAGNOSIS — E83.52 HYPERCALCEMIA: ICD-10-CM

## 2023-10-18 DIAGNOSIS — L81.7 PIGMENTED PURPURIC DERMATOSIS: ICD-10-CM

## 2023-10-18 DIAGNOSIS — Z23 ENCOUNTER FOR IMMUNIZATION: ICD-10-CM

## 2023-10-18 PROCEDURE — 99213 OFFICE O/P EST LOW 20 MIN: CPT | Mod: 25

## 2023-10-18 PROCEDURE — 36415 COLL VENOUS BLD VENIPUNCTURE: CPT

## 2023-10-20 LAB
ALBUMIN SERPL ELPH-MCNC: 4.7 G/DL
ALP BLD-CCNC: 83 U/L
ALT SERPL-CCNC: 31 U/L
ANION GAP SERPL CALC-SCNC: 12 MMOL/L
AST SERPL-CCNC: 29 U/L
BILIRUB SERPL-MCNC: 0.5 MG/DL
BUN SERPL-MCNC: 14 MG/DL
CALCIUM SERPL-MCNC: 10 MG/DL
CHLORIDE SERPL-SCNC: 104 MMOL/L
CHOLEST SERPL-MCNC: 146 MG/DL
CO2 SERPL-SCNC: 24 MMOL/L
CREAT SERPL-MCNC: 0.63 MG/DL
EGFR: 91 ML/MIN/1.73M2
GLUCOSE SERPL-MCNC: 102 MG/DL
HDLC SERPL-MCNC: 82 MG/DL
LDLC SERPL CALC-MCNC: 53 MG/DL
NONHDLC SERPL-MCNC: 64 MG/DL
POTASSIUM SERPL-SCNC: 5.2 MMOL/L
PROT SERPL-MCNC: 7 G/DL
SODIUM SERPL-SCNC: 140 MMOL/L
TRIGL SERPL-MCNC: 49 MG/DL

## 2023-10-23 ENCOUNTER — RESULT REVIEW (OUTPATIENT)
Age: 78
End: 2023-10-23

## 2023-10-23 ENCOUNTER — APPOINTMENT (OUTPATIENT)
Dept: MAMMOGRAPHY | Facility: CLINIC | Age: 78
End: 2023-10-23
Payer: MEDICARE

## 2023-10-23 PROCEDURE — 77063 BREAST TOMOSYNTHESIS BI: CPT

## 2023-10-23 PROCEDURE — 77067 SCR MAMMO BI INCL CAD: CPT

## 2023-11-20 ENCOUNTER — APPOINTMENT (OUTPATIENT)
Dept: VASCULAR SURGERY | Facility: CLINIC | Age: 78
End: 2023-11-20

## 2023-12-18 ENCOUNTER — APPOINTMENT (OUTPATIENT)
Dept: INTERNAL MEDICINE | Facility: CLINIC | Age: 78
End: 2023-12-18
Payer: MEDICARE

## 2023-12-18 PROCEDURE — 99442: CPT | Mod: 95

## 2023-12-18 NOTE — HISTORY OF PRESENT ILLNESS
[Home] : at home, [unfilled] , at the time of the visit. [Medical Office: ([facility])___] : at the medical office located in  [Verbal consent obtained from patient] : the patient, [unfilled] [FreeTextEntry1] : Covid: - symptoms started 11/17/23 - covid positive today - temp 100.3 T max reduced with Tylenol prn - myalgia, fatigue, cough dry, nasal congestion - denies CP, SOB, hemoptysis [Time Spent: ___ minutes] : I have spent [unfilled] minutes with the patient on the telephone

## 2024-01-03 ENCOUNTER — APPOINTMENT (OUTPATIENT)
Dept: VASCULAR SURGERY | Facility: CLINIC | Age: 79
End: 2024-01-03
Payer: MEDICARE

## 2024-01-03 VITALS
HEART RATE: 103 BPM | HEIGHT: 64.5 IN | SYSTOLIC BLOOD PRESSURE: 135 MMHG | DIASTOLIC BLOOD PRESSURE: 74 MMHG | WEIGHT: 110 LBS | BODY MASS INDEX: 18.55 KG/M2

## 2024-01-03 PROCEDURE — 99203 OFFICE O/P NEW LOW 30 MIN: CPT

## 2024-01-03 PROCEDURE — 93971 EXTREMITY STUDY: CPT

## 2024-01-03 RX ORDER — FLUTICASONE FUROATE 27.5 UG/1
27.5 SPRAY, METERED NASAL
Qty: 1 | Refills: 1 | Status: DISCONTINUED | COMMUNITY
Start: 2023-12-18 | End: 2024-01-03

## 2024-01-03 RX ORDER — CLOBETASOL PROPIONATE 0.5 MG/G
0.05 OINTMENT TOPICAL DAILY
Qty: 1 | Refills: 3 | Status: ACTIVE | COMMUNITY
Start: 2024-01-03 | End: 1900-01-01

## 2024-01-03 RX ORDER — PROMETHAZINE HYDROCHLORIDE 6.25 MG/5ML
6.25 SOLUTION ORAL EVERY 8 HOURS
Qty: 210 | Refills: 0 | Status: DISCONTINUED | COMMUNITY
Start: 2023-12-18 | End: 2024-01-03

## 2024-01-03 RX ORDER — NIRMATRELVIR AND RITONAVIR 300-100 MG
20 X 150 MG & KIT ORAL
Qty: 1 | Refills: 0 | Status: DISCONTINUED | COMMUNITY
Start: 2023-12-18 | End: 2024-01-03

## 2024-01-05 NOTE — ADDENDUM
[FreeTextEntry1] : I, Dr. Gee Saldivar, personally performed the evaluation and management (E/M) services for this new patient.  That E/M includes conducting the initial examination, assessing all conditions, and establishing the plan of care.  Today, my ACP, Katherin Varma NP, was here to observe my evaluation and management services for this patient to be followed going forward.  The documentation for this encounter was entered by Alla Keller acting as a scribe for Dr. Gee Saldivar.

## 2024-01-05 NOTE — PROCEDURE
[FreeTextEntry1] : RLE venous duplex ordered to r/o insuff and eval vv, shows: no DVT/SVT. Positive GSV reflux. Gross vv.

## 2024-01-05 NOTE — HISTORY OF PRESENT ILLNESS
[FreeTextEntry1] : 79 y/o F referred by Dr Ordoñez, She has a PMHx of HLD, hypercalcemia, Schamberg's, Rosacea, asymptomatic varicose veins, presenting today for evaluation of a wound on the right leg. She explains it opened about two weeks ago. She uses antimicrobial cleanser and applies bacitracin daily. She chronic discoloration on both distal calves (slightly worse on the right side), present for many years. Denies any previous ulcers, hx of DVT/SVT, vein procedures, fever, chills, pus from the wound or trauma to the leg. No reported symptoms of arterial insufficiency. She tries to stay active however, she reports balance issues. She does to PT once a week for this.   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

## 2024-01-05 NOTE — PHYSICAL EXAM
[Respiratory Effort] : normal respiratory effort [Alert] : alert [Oriented to Person] : oriented to person [Oriented to Place] : oriented to place [Oriented to Time] : oriented to time [Calm] : calm [Ankle Swelling (On Exam)] : present [Ankle Swelling Bilaterally] : bilaterally  [Varicose Veins Of Lower Extremities] : present [Varicose Veins Of The Right Leg] : of the right leg [Ankle Swelling On The Right] : mild [] : bilaterally [Ankle Swelling On The Left] : moderate [2+] : left 2+ [de-identified] : WN/WD [FreeTextEntry1] : Scattered spider veins on both LEs and RLE with mildly bulging varicose vein on the distal calf. Skin on both calves with mild hyperpigmentation changes c/w venous stasis discoloration. Bilt mild to moderate edema (R>L). Superficial wound on most proximal, dorsal aspect of the R foot, no signs of infection but some stasis surrouding inflammation, irregular borders and base with granulation and film of fibrinous tissue.  [de-identified] : FROM [de-identified] : See cardiovascular section

## 2024-01-05 NOTE — ASSESSMENT
[Arterial/Venous Disease] : arterial/venous disease [Medication Management] : medication management [FreeTextEntry1] : 77 y/o F w/ R GSV reflux, bilt asymptomatic varicose and spider veins, R foot ulcer with chronic bilateral edema and chronic venous stasis. On exam, palpable PT pulses bilaterally. Scattered spider veins on both LEs and RLE with mildly bulging varicose vein on the distal calf. Skin on both calves with mild hyperpigmentation changes c/w venous stasis discoloration. Bilt mild to moderate edema (R>L). Superficial wound on most proximal, dorsal aspect of the R foot, no signs of infection but some stasis surrounding inflammation, irregular borders and base with granulation and film of fibrinous tissue.  Venous duplex showed no DVT/SVT. Positive GSV reflux. Gross vv. Pt recommended to rest with leg above chest level as much as she can as edema can interfere with wound healing. For the next week, pt to continue daily wound care with application of clobetasol around the wound then cover with Xeroform and gauze. Pt was instructed to limit physical activities for the time being but can go to PT weekly. F/u in 1 week.    [Foot care/Footwear] : foot care/footwear [Ulcer Care] : ulcer care

## 2024-01-10 ENCOUNTER — APPOINTMENT (OUTPATIENT)
Dept: VASCULAR SURGERY | Facility: CLINIC | Age: 79
End: 2024-01-10
Payer: MEDICARE

## 2024-01-10 VITALS
SYSTOLIC BLOOD PRESSURE: 155 MMHG | HEIGHT: 64.5 IN | HEART RATE: 102 BPM | DIASTOLIC BLOOD PRESSURE: 70 MMHG | BODY MASS INDEX: 18.55 KG/M2 | WEIGHT: 110 LBS

## 2024-01-10 PROCEDURE — 99213 OFFICE O/P EST LOW 20 MIN: CPT

## 2024-01-15 ENCOUNTER — RX RENEWAL (OUTPATIENT)
Age: 79
End: 2024-01-15

## 2024-01-15 RX ORDER — ATORVASTATIN CALCIUM 10 MG/1
10 TABLET, FILM COATED ORAL
Qty: 90 | Refills: 3 | Status: ACTIVE | COMMUNITY
Start: 2020-01-10 | End: 1900-01-01

## 2024-01-17 NOTE — PHYSICAL EXAM
[Respiratory Effort] : normal respiratory effort [Ankle Swelling (On Exam)] : present [Ankle Swelling Bilaterally] : bilaterally  [Varicose Veins Of Lower Extremities] : present [Varicose Veins Of The Right Leg] : of the right leg [Ankle Swelling On The Right] : mild [] : present [Ankle Swelling On The Left] : moderate [Alert] : alert [Oriented to Person] : oriented to person [Oriented to Place] : oriented to place [Oriented to Time] : oriented to time [Calm] : calm [2+] : right 2+ [de-identified] : WN/WD [FreeTextEntry1] : Scattered spider veins on both LEs and RLE with mildly bulging varicose vein on the distal calf. Skin on both calves with mild hyperpigmentation changes c/w venous stasis discoloration. Bilt mild to mild edema (R>L, improved). Superficial wound on most proximal, dorsal aspect of the R foot (smaller and new skin edges), no signs of infection and very mild surrounding inflammation, irregular borders and base with granulation and film of fibrinous tissue.  [de-identified] : FROM [de-identified] : See cardiovascular section

## 2024-01-17 NOTE — ADDENDUM
[FreeTextEntry1] : I, Dr. Gee Saldivar, personally performed the evaluation and management (E/M) services for this established patient who presents today with (a) new problem(s)/exacerbation of (an) existing condition(s).  That E/M includes conducting the examination, assessing all new/exacerbated conditions, and establishing a new plan of care.  Today, my ACP, Katherin Varma NP, was here to observe my evaluation and management services for this new problem/exacerbated condition to be followed going forward.  The documentation for this encounter was entered by Alla Keller acting as a scribe for Dr. Gee Saldivar.

## 2024-01-17 NOTE — HISTORY OF PRESENT ILLNESS
[FreeTextEntry1] : 79 y/o F referred by Dr Ordoñez, She has a PMHx of HLD, hypercalcemia, Schamberg's, Rosacea, R GSV reflux, bilt asymptomatic varicose and spider veins, R foot ulcer with chronic bilateral edema and chronic venous stasis.  Pt presents today to f/u on the wound. She followed recommendations provided last week by cleaning her wound daily with peroxide, applying Clobetasol, then wrapping with Xeroform and gauze. She reports her leg and wound have improved. Denies any fever, chills, pus from the wound or trauma to the leg.   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

## 2024-01-17 NOTE — ASSESSMENT
[Arterial/Venous Disease] : arterial/venous disease [Medication Management] : medication management [Foot care/Footwear] : foot care/footwear [Ulcer Care] : ulcer care [FreeTextEntry1] : 77 y/o F w/ R GSV reflux, bilt asymptomatic varicose and spider veins, R foot ulcer with chronic bilateral edema and chronic venous stasis. On exam, scattered spider veins on both LEs and RLE with mildly bulging varicose vein on the distal calf. Skin on both calves with mild hyperpigmentation changes c/w venous stasis discoloration. Bilt mild to mild edema (R>L, improved). Superficial wound on most proximal, dorsal aspect of the R foot (smaller and new skin edges), no signs of infection and very mild surrounding inflammation, irregular borders and base with granulation and film of fibrinous tissue.  Reviewed venous duplex from last week. Pt recommended to continue to rest with leg above chest level as much as she can as edema can interfere with wound healing. Continue daily wound care with application of clobetasol around the wound then cover with Xeroform and gauze. Pt was instructed to limit physical activities for the time being but can go to PT weekly. F/u in 1 week

## 2024-01-19 ENCOUNTER — APPOINTMENT (OUTPATIENT)
Dept: VASCULAR SURGERY | Facility: CLINIC | Age: 79
End: 2024-01-19
Payer: MEDICARE

## 2024-01-19 VITALS
WEIGHT: 110 LBS | HEIGHT: 64.5 IN | BODY MASS INDEX: 18.55 KG/M2 | DIASTOLIC BLOOD PRESSURE: 75 MMHG | SYSTOLIC BLOOD PRESSURE: 182 MMHG | HEART RATE: 102 BPM

## 2024-01-19 PROCEDURE — 99212 OFFICE O/P EST SF 10 MIN: CPT

## 2024-01-19 NOTE — HISTORY OF PRESENT ILLNESS
[FreeTextEntry1] : 77 y/o F referred by Dr Ordoñez, She has a PMHx of HLD, hypercalcemia, Schamberg's, Rosacea, R GSV reflux, bilt asymptomatic varicose and spider veins, R foot ulcer with chronic bilateral edema and chronic venous stasis.  Pt presents today to f/u on the wound. She has been following wound care instructions, cleaning her wound daily with peroxide, applying Clobetasol, then wrapping with Xeroform and gauze. She reports her leg and wound have improved. Denies any fever, chills, pus from the wound or trauma to the leg. She does mention a new abrasion closer to the knee which she is not sure how it happened. She has been wearing the ACE bandage only when going outside.   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

## 2024-01-19 NOTE — ASSESSMENT
[FreeTextEntry1] : 79 y/o F w/ R GSV reflux, bilt asymptomatic varicose and spider veins, R foot ulcer with chronic bilateral edema and chronic venous stasis. On exam, scattered spider veins on both LEs and RLE with mildly bulging varicose vein on the distal calf. Skin on both calves with mild hyperpigmentation changes c/w venous stasis discoloration. Bilt mild edema. Superficial wound on most proximal, dorsal aspect of the R foot (smaller and new skin edges and islands of granulation tissue), no signs of infection, irregular borders and base with granulation and small film of fibrinous tissue. R proximal medial calf, superficial skin abrasion, clean with small, rolled skin edge, no signs of infection.  Pt recommended to continue to rest with leg above chest level as much as she can as edema can interfere with wound healing. Continue daily wound care with application of clobetasol around the wound then cover with Xeroform and gauze. Pt was instructed to limit physical activities for the time being but can go to PT weekly. F/u in 1 week    [Arterial/Venous Disease] : arterial/venous disease [Medication Management] : medication management [Foot care/Footwear] : foot care/footwear [Ulcer Care] : ulcer care

## 2024-01-24 ENCOUNTER — APPOINTMENT (OUTPATIENT)
Dept: VASCULAR SURGERY | Facility: CLINIC | Age: 79
End: 2024-01-24
Payer: MEDICARE

## 2024-01-24 PROCEDURE — 99213 OFFICE O/P EST LOW 20 MIN: CPT

## 2024-01-31 ENCOUNTER — APPOINTMENT (OUTPATIENT)
Dept: VASCULAR SURGERY | Facility: CLINIC | Age: 79
End: 2024-01-31
Payer: MEDICARE

## 2024-01-31 VITALS
DIASTOLIC BLOOD PRESSURE: 81 MMHG | BODY MASS INDEX: 18.55 KG/M2 | SYSTOLIC BLOOD PRESSURE: 155 MMHG | HEART RATE: 98 BPM | WEIGHT: 110 LBS | HEIGHT: 64.5 IN

## 2024-01-31 PROCEDURE — 99213 OFFICE O/P EST LOW 20 MIN: CPT

## 2024-01-31 NOTE — ASSESSMENT
[Arterial/Venous Disease] : arterial/venous disease [Medication Management] : medication management [Foot care/Footwear] : foot care/footwear [Ulcer Care] : ulcer care [FreeTextEntry1] : 77 y/o F w/ R GSV reflux, bilt asymptomatic varicose and spider veins, R foot ulcer with chronic bilateral edema and chronic venous stasis. On exam, scattered spider veins on both LEs and RLE with mildly bulging varicose vein on the distal calf. Skin on both calves with mild hyperpigmentation changes c/w venous stasis discoloration. Bilt mild edema. Superficial wound on most proximal, dorsal aspect of the R foot (smaller and new skin edges and islands of granulation tissue), no signs of infection, irregular borders and base with granulation and small film of fibrinous tissue. R proximal medial calf, superficial skin abrasion, clean with small, rolled skin edge, no signs of infection (smaller, almost completely healed). Pt recommended to continue to rest with leg above chest level as much as she can as edema can interfere with wound healing. Continue daily wound care with application of clobetasol around the wound then cover with Xeroform and gauze. Pt was instructed to limit physical activities for the time being but can go to PT weekly. F/u in 1 week

## 2024-01-31 NOTE — HISTORY OF PRESENT ILLNESS
[FreeTextEntry1] : 79 y/o F referred by Dr Ordoñez, She has a PMHx of HLD, hypercalcemia, Schamberg's, Rosacea, R GSV reflux, bilt asymptomatic varicose and spider veins, R foot ulcer with chronic bilateral edema and chronic venous stasis.  Pt presents today to f/u on the wound. She has been following wound care instructions, cleaning her wound daily with peroxide, applying Clobetasol, then wrapping with Xeroform and gauze. She reports her leg and wound have improved. Denies any fever, chills, pus from the wound or trauma to the leg. She has been wearing the ACE bandage only when going outside.   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

## 2024-01-31 NOTE — ADDENDUM
[FreeTextEntry1] : I, Dr. Gee Saldivar, personally performed the evaluation and management (E/M) services for this established patient who presents today with (a) new problem(s)/exacerbation of (an) existing condition(s).  That E/M includes conducting the examination, assessing all new/exacerbated conditions, and establishing a new plan of care.  Today, my ACP, Katherin Varma NP, was here to observe my evaluation and management services for this new problem/exacerbated condition to be followed going forward.

## 2024-01-31 NOTE — PHYSICAL EXAM
[Respiratory Effort] : normal respiratory effort [2+] : right 2+ [Ankle Swelling (On Exam)] : present [Ankle Swelling Bilaterally] : bilaterally  [Varicose Veins Of Lower Extremities] : present [Varicose Veins Of The Right Leg] : of the right leg [Ankle Swelling On The Right] : mild [] : bilaterally [Ankle Swelling On The Left] : moderate [Alert] : alert [Oriented to Person] : oriented to person [Oriented to Place] : oriented to place [Oriented to Time] : oriented to time [Calm] : calm [de-identified] : WN/WD [FreeTextEntry1] : Scattered spider veins on both LEs and RLE with mildly bulging varicose vein on the distal calf. Skin on both calves with mild hyperpigmentation changes c/w venous stasis discoloration. Bilt mild edema. Superficial wound on most proximal, dorsal aspect of the R foot (smaller and new skin edges and islands of granulation tissue), no signs of infection, irregular borders and base with granulation and small film of fibrinous tissue. R proximal medial calf, superficial skin abrasion, clean with small, rolled skin edge, no signs of infection (smaller, almost completely healed) [de-identified] : FROM [de-identified] : See cardiovascular section

## 2024-02-05 NOTE — HISTORY OF PRESENT ILLNESS
[FreeTextEntry1] : 77 y/o F referred by Dr Ordoñez, She has a PMHx of HLD, hypercalcemia, Schamberg's, Rosacea, R GSV reflux, bilt asymptomatic varicose and spider veins, R foot ulcer with chronic bilateral edema and chronic venous stasis.    Pt presents today to f/u on the wound. She has been following wound care instructions, cleaning her wound daily with peroxide, applying Clobetasol, then wrapping with Xeroform and gauze. She reports her leg and wound have improved. Denies any fever, chills, pus from the wound or trauma to the leg. She has been wearing the ACE bandage only when going outside.    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

## 2024-02-05 NOTE — ASSESSMENT
[Arterial/Venous Disease] : arterial/venous disease [Medication Management] : medication management [Foot care/Footwear] : foot care/footwear [Ulcer Care] : ulcer care [FreeTextEntry1] : 77 y/o F w/ R GSV reflux, bilt asymptomatic varicose and spider veins, R foot ulcer with chronic bilateral edema and chronic venous stasis. On exam, scattered spider veins on both LEs and RLE with mildly bulging varicose vein on the distal calf. Skin on both calves with mild hyperpigmentation changes c/w venous stasis discoloration. Bilt mild edema. Superficial wound on most proximal, dorsal aspect of the R foot (smaller and new skin edges and islands of granulation tissue), no signs of infection, irregular borders and base with granulation and small film of fibrinous tissue. R proximal medial calf, superficial skin abrasion, clean with small, rolled skin edge, no signs of infection (smaller, almost completely healed). Pt recommended to continue to rest as much as possible with leg above chest level as much as she can as edema can interfere with wound healing. Continue daily wound care with application of clobetasol around the wound then cover with Xeroform and gauze. Pt was instructed to limit physical activities for the time being but can go to PT weekly.  F/u in 2-3 weeks

## 2024-02-05 NOTE — PHYSICAL EXAM
[Respiratory Effort] : normal respiratory effort [2+] : right 2+ [Ankle Swelling (On Exam)] : present [Ankle Swelling Bilaterally] : bilaterally  [Varicose Veins Of Lower Extremities] : present [Varicose Veins Of The Right Leg] : of the right leg [Ankle Swelling On The Right] : mild [] : bilaterally [Ankle Swelling On The Left] : moderate [Alert] : alert [Oriented to Person] : oriented to person [Oriented to Place] : oriented to place [Oriented to Time] : oriented to time [Calm] : calm [de-identified] : WN/WD [FreeTextEntry1] : Scattered spider veins on both LEs and RLE with mildly bulging varicose vein on the distal calf. Skin on both calves with mild hyperpigmentation changes c/w venous stasis discoloration. Bilt mild edema. Superficial wound on most proximal, dorsal aspect of the R foot (smaller and new skin edges and islands of granulation tissue), no signs of infection, irregular borders and base with granulation and small film of fibrinous tissue. R proximal medial calf, superficial skin abrasion, clean with small, rolled skin edge, no signs of infection (smaller, almost completely healed) [de-identified] : FROM [de-identified] : See cardiovascular section

## 2024-02-16 ENCOUNTER — APPOINTMENT (OUTPATIENT)
Dept: VASCULAR SURGERY | Facility: CLINIC | Age: 79
End: 2024-02-16
Payer: MEDICARE

## 2024-02-16 VITALS
DIASTOLIC BLOOD PRESSURE: 82 MMHG | HEART RATE: 102 BPM | BODY MASS INDEX: 18.55 KG/M2 | HEIGHT: 64.5 IN | WEIGHT: 110 LBS | SYSTOLIC BLOOD PRESSURE: 151 MMHG

## 2024-02-16 PROCEDURE — 99212 OFFICE O/P EST SF 10 MIN: CPT

## 2024-02-16 NOTE — PHYSICAL EXAM
[Respiratory Effort] : normal respiratory effort [Ankle Swelling (On Exam)] : present [Ankle Swelling Bilaterally] : bilaterally  [Varicose Veins Of Lower Extremities] : present [Varicose Veins Of The Right Leg] : of the right leg [Ankle Swelling On The Right] : mild [] : bilaterally [Ankle Swelling On The Left] : moderate [Alert] : alert [Oriented to Person] : oriented to person [Oriented to Place] : oriented to place [Oriented to Time] : oriented to time [Calm] : calm [de-identified] : WN/WD [FreeTextEntry1] : Scattered spider veins on both LEs and RLE with mildly bulging varicose vein on the distal calf. Skin on both calves with mild hyperpigmentation changes c/w venous stasis discoloration. Bilt mild edema. Superficial wound on most proximal, dorsal aspect of the R foot (smaller and new skin edges and islands of granulation tissue), no signs of infection, irregular borders and base with granulation and small film of fibrinous tissue. R medial ankle small, pin-point wound, superficial, no erythema, base clean, no drainage.  [de-identified] : FROM [de-identified] : See cardiovascular section

## 2024-02-16 NOTE — HISTORY OF PRESENT ILLNESS
[FreeTextEntry1] : 77 y/o F referred by Dr Ordoñez, She has a PMHx of HLD, hypercalcemia, Schamberg's, Rosacea, R GSV reflux, bilt asymptomatic varicose and spider veins, R foot ulcer with chronic bilateral edema and chronic venous stasis.    Pt presents today to f/u on the wound. She has been following wound care instructions, cleaning her wound daily with peroxide, applying Clobetasol, then wrapping with Xeroform and gauze. She reports her leg and wound have improved but she does have a new pin-point wound on the medial ankle. Denies any fever, chills, pus from the wound or trauma to the leg. She has been wearing the ACE bandage only when going outside.    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

## 2024-02-16 NOTE — ASSESSMENT
[FreeTextEntry1] : 77 y/o F w/ R GSV reflux, bilt asymptomatic varicose and spider veins, R foot ulcer with chronic bilateral edema and chronic venous stasis. On exam, scattered spider veins on both LEs and RLE with mildly bulging varicose vein on the distal calf. Skin on both calves with mild hyperpigmentation changes c/w venous stasis discoloration. Bilt mild edema. Superficial wound on most proximal, dorsal aspect of the R foot (smaller and new skin edges and islands of granulation tissue), no signs of infection, irregular borders and base with granulation and small film of fibrinous tissue. R medial ankle small, pin-point wound, superficial, no erythema, base clean, no drainage.  Pt recommended to continue to rest as much as possible with leg above chest level as much as she can as edema can interfere with wound healing. Continue daily wound care with application of clobetasol around the wounds then cover with Xeroform and gauze. Pt was instructed to limit physical activities for the time being but can go to PT weekly.  F/u in 2-3 weeks    [Arterial/Venous Disease] : arterial/venous disease [Medication Management] : medication management [Foot care/Footwear] : foot care/footwear [Ulcer Care] : ulcer care

## 2024-02-28 ENCOUNTER — APPOINTMENT (OUTPATIENT)
Dept: VASCULAR SURGERY | Facility: CLINIC | Age: 79
End: 2024-02-28
Payer: MEDICARE

## 2024-02-28 PROCEDURE — 99213 OFFICE O/P EST LOW 20 MIN: CPT

## 2024-03-04 NOTE — PHYSICAL EXAM
[Respiratory Effort] : normal respiratory effort [Ankle Swelling Bilaterally] : bilaterally  [Ankle Swelling (On Exam)] : present [Varicose Veins Of Lower Extremities] : present [Varicose Veins Of The Right Leg] : of the right leg [Ankle Swelling On The Right] : mild [] : bilaterally [Ankle Swelling On The Left] : moderate [Alert] : alert [Oriented to Person] : oriented to person [Oriented to Place] : oriented to place [Oriented to Time] : oriented to time [Calm] : calm [de-identified] : WN/WD [FreeTextEntry1] : Scattered spider veins on both LEs and RLE with mildly bulging varicose vein on the distal calf. Skin on both calves with mild hyperpigmentation changes c/w venous stasis discoloration. Bilt mild edema. Superficial wound on most proximal, dorsal aspect of the R foot (smaller and new skin edges and islands of granulation tissue), no signs of infection, irregular borders and base with granulation and small film of fibrinous tissue. R medial ankle small, pin-point wound, superficial, no erythema, base clean, no drainage.  [de-identified] : FROM [de-identified] : See cardiovascular section

## 2024-03-04 NOTE — ADDENDUM
[FreeTextEntry1] : I, Dr. Gee Saldivar, personally performed the evaluation and management (E/M) services for this established patient who presents today with (a) chronic problem(s) of (an) existing condition(s).  That E/M includes conducting the examination, assessing all conditions, and establishing a plan of care. Today, my ACP, Katherin Varma NP, was here to observe my evaluation and management services for this condition(s) to be followed going forward.  I, Alla Keller, am scribing for an in the presence of  Dr. Gee Saldivar on this date in the following sections HISTORY OF PRESENT ILLNESS, PAST MEDICAL/FAMILY/SOCIAL HISTORY; REVIEW OF SYSTEMS; VITAL SIGNS; PHYSICAL EXAM; ASSESSMENT/PLAN.

## 2024-03-04 NOTE — ASSESSMENT
[Arterial/Venous Disease] : arterial/venous disease [Medication Management] : medication management [Foot care/Footwear] : foot care/footwear [Ulcer Care] : ulcer care [FreeTextEntry1] : 77 y/o F w/ R GSV reflux, bilt asymptomatic varicose and spider veins, R foot ulcer with chronic bilateral edema and chronic venous stasis. On exam, scattered spider veins on both LEs and RLE with mildly bulging varicose vein on the distal calf. Skin on both calves with mild hyperpigmentation changes c/w venous stasis discoloration. Bilt mild edema. Superficial wound on most proximal, dorsal aspect of the R foot (smaller and new skin edges and islands of granulation tissue), no signs of infection, irregular borders and base with granulation and small film of fibrinous tissue. R medial ankle small, pin-point wound, superficial, no erythema, base clean, no drainage.  Pt recommended to continue to rest as much as possible with leg above chest level as much as she can as edema can interfere with wound healing. Continue daily wound care with application of clobetasol around the wounds then cover with Xeroform and gauze. Pt was instructed to limit physical activities for the time being but can go to PT weekly.  F/u in 2-3 weeks

## 2024-03-04 NOTE — HISTORY OF PRESENT ILLNESS
[FreeTextEntry1] : 79 y/o F referred by Dr Ordoñez, She has a PMHx of HLD, hypercalcemia, Schamberg's, Rosacea, R GSV reflux, bilt asymptomatic varicose and spider veins, R foot ulcer with chronic bilateral edema and chronic venous stasis.    Pt presents today to f/u on the wound. She has been following wound care instructions, cleaning her wound daily with peroxide, applying Clobetasol, then wrapping with Xeroform and gauze. She reports her leg and wound have improved and her new pin-point wound on the medial ankle has improved. Denies any fever, chills, pus from the wound or trauma to the leg. She has been wearing the ACE bandage only when going outside.     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

## 2024-03-13 ENCOUNTER — APPOINTMENT (OUTPATIENT)
Dept: VASCULAR SURGERY | Facility: CLINIC | Age: 79
End: 2024-03-13
Payer: MEDICARE

## 2024-03-13 VITALS
HEIGHT: 64.5 IN | DIASTOLIC BLOOD PRESSURE: 77 MMHG | SYSTOLIC BLOOD PRESSURE: 173 MMHG | WEIGHT: 110 LBS | BODY MASS INDEX: 18.55 KG/M2 | HEART RATE: 104 BPM

## 2024-03-13 PROCEDURE — 99213 OFFICE O/P EST LOW 20 MIN: CPT

## 2024-03-13 RX ORDER — BISMUTH TRIBROMOPH/PETROLATUM 1"X8"
BANDAGE TOPICAL
Qty: 1 | Refills: 0 | Status: ACTIVE | COMMUNITY
Start: 2024-03-13 | End: 1900-01-01

## 2024-03-19 NOTE — ASSESSMENT
[Arterial/Venous Disease] : arterial/venous disease [Medication Management] : medication management [Foot care/Footwear] : foot care/footwear [Ulcer Care] : ulcer care [FreeTextEntry1] : 79 y/o F w/ R GSV reflux, bilt asymptomatic varicose and spider veins, R foot ulcer with chronic bilateral edema and chronic venous stasis. On exam, scattered spider veins on both LEs and RLE with mildly bulging varicose vein on the distal calf. Skin on both calves with mild hyperpigmentation changes c/w venous stasis discoloration. Bilt mild edema. Superficial wound on most proximal, dorsal aspect of the R foot (smaller and new skin edges and islands of granulation tissue, almost healed), no signs of infection, irregular borders and base with granulation and small film of fibrinous tissue. R medial ankle small, pin-point wound, superficial, no erythema, base clean, no drainage (almost healed). Proximal shin with small area of irritated skin c/w stasis dermatitis. Pt recommended to continue to rest as much as possible with leg above chest level as much as she can as edema can interfere with wound healing. Continue daily wound care with application of clobetasol around the wounds and on proximal shin, then cover wounds with Xeroform and gauze.  F/u in 3-4 weeks

## 2024-03-19 NOTE — HISTORY OF PRESENT ILLNESS
[FreeTextEntry1] : 79 y/o F referred by Dr Ordoñez, She has a PMHx of HLD, hypercalcemia, Schamberg's, Rosacea, R GSV reflux, bilt asymptomatic varicose and spider veins, R foot ulcer with chronic bilateral edema and chronic venous stasis.    Pt presents today to f/u on the wound. She has been following wound care instructions, cleaning her wound daily with peroxide, applying Clobetasol, then wrapping with Xeroform and gauze. She reports her leg and wound have improved and her new pin-point wound on the medial ankle has improved. She did use a new cream/lotion with fragrance that irritated the shin area and has now stopped using it. Denies any fever, chills, pus from the wound or trauma to the leg. She has been wearing the ACE bandage only when going outside.     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

## 2024-03-19 NOTE — PHYSICAL EXAM
[Respiratory Effort] : normal respiratory effort [Ankle Swelling (On Exam)] : present [Ankle Swelling Bilaterally] : bilaterally  [Varicose Veins Of Lower Extremities] : present [Varicose Veins Of The Right Leg] : of the right leg [Ankle Swelling On The Right] : mild [] : bilaterally [Ankle Swelling On The Left] : moderate [Alert] : alert [Oriented to Person] : oriented to person [Oriented to Place] : oriented to place [Oriented to Time] : oriented to time [Calm] : calm [de-identified] : FROM [de-identified] : WN/WD [FreeTextEntry1] : Scattered spider veins on both LEs and RLE with mildly bulging varicose vein on the distal calf. Skin on both calves with mild hyperpigmentation changes c/w venous stasis discoloration. Bilt mild edema. Superficial wound on most proximal, dorsal aspect of the R foot (smaller and new skin edges and islands of granulation tissue, almost healed), no signs of infection, irregular borders and base with granulation and small film of fibrinous tissue. R medial ankle small, pin-point wound, superficial, no erythema, base clean, no drainage (almost healed). Proximal shin with small area of irritated skin c/w stasis dermatitis. [de-identified] : See cardiovascular section

## 2024-04-02 ENCOUNTER — APPOINTMENT (OUTPATIENT)
Dept: VASCULAR SURGERY | Facility: CLINIC | Age: 79
End: 2024-04-02
Payer: MEDICARE

## 2024-04-02 VITALS
DIASTOLIC BLOOD PRESSURE: 78 MMHG | BODY MASS INDEX: 18.55 KG/M2 | HEART RATE: 96 BPM | SYSTOLIC BLOOD PRESSURE: 155 MMHG | HEIGHT: 64.5 IN | WEIGHT: 110 LBS

## 2024-04-02 DIAGNOSIS — L97.919 NON-PRESSURE CHRONIC ULCER OF UNSPECIFIED PART OF RIGHT LOWER LEG WITH UNSPECIFIED SEVERITY: ICD-10-CM

## 2024-04-02 PROCEDURE — 93971 EXTREMITY STUDY: CPT

## 2024-04-02 PROCEDURE — 99213 OFFICE O/P EST LOW 20 MIN: CPT

## 2024-04-03 PROBLEM — L97.919 ULCER OF RIGHT LEG: Status: ACTIVE | Noted: 2024-01-03

## 2024-04-03 NOTE — PHYSICAL EXAM
[Respiratory Effort] : normal respiratory effort [Ankle Swelling (On Exam)] : present [Ankle Swelling Bilaterally] : bilaterally  [Varicose Veins Of Lower Extremities] : present [Varicose Veins Of The Right Leg] : of the right leg [Ankle Swelling On The Right] : mild [] : bilaterally [Ankle Swelling On The Left] : moderate [Oriented to Person] : oriented to person [Alert] : alert [Oriented to Place] : oriented to place [Oriented to Time] : oriented to time [Calm] : calm [de-identified] : WN/WD [FreeTextEntry1] : Scattered spider veins on both LEs and RLE with mildly bulging varicose vein on the distal calf. Skin on both calves with mild hyperpigmentation changes c/w venous stasis discoloration. Bilt mild edema, exacerbated on distal-lateral to posterior calf. Superficial wound on most proximal, dorsal aspect of the R foot (slightly smaller and new skin edges and islands of granulation tissue), no signs of infection, irregular borders and base with granulation and small film of fibrinous tissue. R medial ankle small, pin-point wound, superficial, no erythema, base clean, no drainage (stable).  [de-identified] : FROM [de-identified] : See cardiovascular section

## 2024-04-03 NOTE — HISTORY OF PRESENT ILLNESS
[FreeTextEntry1] : 78 y/o F referred by Dr Ordoñez, She has a PMHx of HLD, hypercalcemia, Schamberg's, Rosacea, R GSV reflux, bilt asymptomatic varicose and spider veins, R foot ulcer with chronic bilateral edema and chronic venous stasis.    Pt presents today to f/u on the wound and also concerns with an area that is more swollen than usual. She explains it is also sensitive with pressure. Denies any leg trauma, new wounds. She has been following wound care instructions, cleaning her wound daily with peroxide, applying Clobetasol, then wrapping with Xeroform and gauze. She has not been using the compression bandages while at home, only to go outside. She did start her home balancing exercises and explains she has to put pressure on each leg for 30 seconds at a time. She is inquiring if this made the area swollen. Denies any fever, chills, pus from the wound.     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

## 2024-04-03 NOTE — ASSESSMENT
[Arterial/Venous Disease] : arterial/venous disease [Medication Management] : medication management [Foot care/Footwear] : foot care/footwear [Ulcer Care] : ulcer care [FreeTextEntry1] : 78 y/o F w/ R GSV reflux, bilt asymptomatic varicose and spider veins, R foot ulcer with chronic bilateral edema and chronic venous stasis. Today, with RLE exacerbation of edema, localized, probably 2/2 trauma or exercises. On exam, scattered spider veins on both LEs and RLE with mildly bulging varicose vein on the distal calf. Skin on both calves with mild hyperpigmentation changes c/w venous stasis discoloration. Bilt mild edema, exacerbated on distal-lateral to posterior calf. Superficial wound on most proximal, dorsal aspect of the R foot (slightly smaller and new skin edges and islands of granulation tissue), no signs of infection, irregular borders and base with granulation and small film of fibrinous tissue. R medial ankle small, pin-point wound, superficial, no erythema, base clean, no drainage (stable).  Venous duplex ordered to r/o SVT/DVT, negative. Pt recommended to rest as much as possible with leg above chest level as much as she can for the next couple of days. Wear the compression bandages, even while at home. Continue daily wound care with application of clobetasol around the wounds and on proximal shin, then cover wounds with Xeroform and gauze. Apply warm compresses over the affected area. Pt to call on Fri to discuss status.

## 2024-05-08 ENCOUNTER — APPOINTMENT (OUTPATIENT)
Dept: VASCULAR SURGERY | Facility: CLINIC | Age: 79
End: 2024-05-08
Payer: MEDICARE

## 2024-05-08 VITALS
HEART RATE: 94 BPM | BODY MASS INDEX: 18.55 KG/M2 | SYSTOLIC BLOOD PRESSURE: 159 MMHG | WEIGHT: 110 LBS | HEIGHT: 64.5 IN | DIASTOLIC BLOOD PRESSURE: 70 MMHG

## 2024-05-08 PROCEDURE — 99213 OFFICE O/P EST LOW 20 MIN: CPT

## 2024-05-10 ENCOUNTER — APPOINTMENT (OUTPATIENT)
Dept: VASCULAR SURGERY | Facility: CLINIC | Age: 79
End: 2024-05-10
Payer: MEDICARE

## 2024-05-10 VITALS
HEART RATE: 97 BPM | WEIGHT: 110 LBS | BODY MASS INDEX: 18.55 KG/M2 | DIASTOLIC BLOOD PRESSURE: 77 MMHG | HEIGHT: 64.5 IN | SYSTOLIC BLOOD PRESSURE: 168 MMHG

## 2024-05-10 DIAGNOSIS — I87.2 VENOUS INSUFFICIENCY (CHRONIC) (PERIPHERAL): ICD-10-CM

## 2024-05-10 PROCEDURE — 99213 OFFICE O/P EST LOW 20 MIN: CPT | Mod: 25

## 2024-05-10 PROCEDURE — 29580 STRAPPING UNNA BOOT: CPT | Mod: RT

## 2024-05-10 NOTE — PHYSICAL EXAM
[Respiratory Effort] : normal respiratory effort [Ankle Swelling (On Exam)] : present [Ankle Swelling Bilaterally] : bilaterally  [Varicose Veins Of Lower Extremities] : present [Varicose Veins Of The Right Leg] : of the right leg [Ankle Swelling On The Right] : mild [] : bilaterally [Ankle Swelling On The Left] : moderate [Alert] : alert [Oriented to Person] : oriented to person [Oriented to Place] : oriented to place [Oriented to Time] : oriented to time [Calm] : calm [de-identified] : WN/WD [FreeTextEntry1] : Scattered spider veins on both LEs and RLE with mildly bulging varicose vein on the distal calf. Skin on both calves with mild hyperpigmentation changes c/w venous stasis discoloration and on the right side stasis dermatitis. Bilt mild edema, improved on the right calf. Superficial wound on R medial ankle, irregular borders and base with mixed granulation and small film of fibrinous tissue with mild erythema, approx 2.5x1.5cm (slight improvement since last visit. Surrounding erythema with mild improvement. [de-identified] : FROM [de-identified] : See cardiovascular section

## 2024-05-10 NOTE — ASSESSMENT
[Arterial/Venous Disease] : arterial/venous disease [Medication Management] : medication management [Foot care/Footwear] : foot care/footwear [Ulcer Care] : ulcer care [FreeTextEntry1] : 78 y/o F w/ R GSV reflux, bilt asymptomatic varicose and spider veins, R foot ulcer with chronic bilateral edema and chronic venous stasis. New R medial ankle large venous ulcer with mild signs of cellulitis and stasis dermatitis.  On exam, scattered spider veins on both LEs and RLE with mildly bulging varicose vein on the distal calf. Skin on both calves with mild hyperpigmentation changes c/w venous stasis discoloration and on the right side stasis dermatitis. Bilt mild edema, improved on the right calf. Superficial wound on R medial ankle, irregular borders and base with mixed granulation and small film of fibrinous tissue with mild erythema, approx 2.5x1.5cm (slight improvement since last visit. Surrounding erythema with mild improvement.  Wound care provided and unna boot applied.  Pt is a candidate for R GSV RFA per previous duplex however size vein 4.1cm, Will consider proceeding depending on wound progress. In the interim, pt recommended to rest with the legs elevated above the chest level, limit standing or standing, and may continue daily walking. Also, continue/finish Keflex 250mg. Pt to return on Wed.

## 2024-05-10 NOTE — PROCEDURE
[FreeTextEntry1] : Wound care provided: wound cleaned with peroxide, lightly debrided, hibiclens soap, hydrocortisone and vitamin A&D applied, unna boot, kerlix and ace

## 2024-05-10 NOTE — HISTORY OF PRESENT ILLNESS
[FreeTextEntry1] : 78 y/o F referred by Dr Ordoñez, She has a PMHx of HLD, hypercalcemia, Schamberg's, Rosacea, R GSV reflux, bilt asymptomatic varicose and spider veins, R foot ulcer with chronic bilateral edema and chronic venous stasis.    Pt presented a couple of days ago with worsening R ankle wound. She was started on Keflex and unna boot was placed. Today, she presents for early change of the unna boot to ensure wound is progressing in the right direction. She explains burning sensation right over the wound only. She denies any other issues with the unna boot. Denies any fever/chills, trouble with the antibiotic.   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  - Lives locally (within 20 blocks)

## 2024-05-13 NOTE — HISTORY OF PRESENT ILLNESS
[FreeTextEntry1] : 80 y/o F referred by Dr Ordoñez, She has a PMHx of HLD, hypercalcemia, Schamberg's, Rosacea, R GSV reflux, bilt asymptomatic varicose and spider veins, R foot ulcer with chronic bilateral edema and chronic venous stasis.    Pt presents today to f/u and reports the wound on her right ankle is bigger and more swollen than usual. She explains it is also sensitive with pressure. Denies any leg trauma, fever or chills. She has been following wound care instructions, cleaning her wound daily with peroxide, applying Clobetasol, then wrapping with Xeroform and gauze. She has not been using the compression bandages while at home, only to go outside. She explains it seems the two small openings "came together to form a larger wound".   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  - Lives locally (within 20 blocks)

## 2024-05-13 NOTE — PROCEDURE
[FreeTextEntry1] : Wound care provided: wound cleaned with peroxide, lightly debrided, hibiclens soap, hydrocortisone around the wound, unna boot, kerlix and ace

## 2024-05-13 NOTE — PHYSICAL EXAM
[Respiratory Effort] : normal respiratory effort [Ankle Swelling (On Exam)] : present [Ankle Swelling Bilaterally] : bilaterally  [Varicose Veins Of Lower Extremities] : present [Varicose Veins Of The Right Leg] : of the right leg [Ankle Swelling On The Right] : mild [] : bilaterally [Ankle Swelling On The Left] : moderate [Alert] : alert [Oriented to Person] : oriented to person [Oriented to Place] : oriented to place [Oriented to Time] : oriented to time [Calm] : calm [de-identified] : WN/WD [FreeTextEntry1] : Scattered spider veins on both LEs and RLE with mildly bulging varicose vein on the distal calf. Skin on both calves with mild hyperpigmentation changes c/w venous stasis discoloration. Bilt mild edema, exacerbated on the right calf. Superficial wound on R medial ankle, irregular borders and base with mixed granulation and small film of fibrinous tissue with mild erythema, approx 2.5x1.5cm [de-identified] : FROM [de-identified] : See cardiovascular section

## 2024-05-13 NOTE — ADDENDUM
[FreeTextEntry1] : I, Dr. Melina Saldivar, personally performed the evaluation and management (E/M) services for this established patient who presents today with (a) new problem(s)/exacerbation of (an) existing condition(s).  That E/M includes conducting the examination, assessing all new/exacerbated conditions, and establishing a new plan of care.  Today, my ACP, Katherin Varma NP, was here to observe my evaluation and management services for this new problem/exacerbated condition to be followed going forward.  Documented by Erwin Venegas acting as a Scribe for MELINA SALDIVAR on 05/08/2024.   All medical record entries made by the Scribe were at my direction and personally dictated by me, MELINA SALDIVAR, on 05/08/2024. I have reviewed the chart and agree that the record accurately reflects my personal performances of the history, physical exam, assessment and plan. I have also personally directed, reviewed, and agree with the discharge instructions.

## 2024-05-13 NOTE — ASSESSMENT
[Arterial/Venous Disease] : arterial/venous disease [Medication Management] : medication management [Foot care/Footwear] : foot care/footwear [Ulcer Care] : ulcer care [FreeTextEntry1] : 78 y/o F w/ R GSV reflux, bilt asymptomatic varicose and spider veins, R foot ulcer with chronic bilateral edema and chronic venous stasis. Today, with RLE exacerbation of edema, localized, probably 2/2 trauma or exercises.  On exam, scattered spider veins on both LEs and RLE with mildly bulging varicose vein on the distal calf. Skin on both calves with mild hyperpigmentation changes c/w venous stasis discoloration. Bilt mild edema, exacerbated on the right calf. Superficial wound on R medial ankle, irregular borders and base with mixed granulation and small film of fibrinous tissue with mild erythema, approx 2.5x1.5cm.  Wound care provided and unna boot applied.  Pt is a candidate for R GSV RFA per previous duplex however size vein 4.1cm, Will consider proceeding depending on wound progress. In the interim, pt recommended to rest with the legs elevated above the chest level, limit standing or standing, and may continue daily walking. Also, pt started on Keflex 250mg for one week. Pt to return on Fri to re-evaluate wound and re-apply Unna boot dressing.

## 2024-05-15 ENCOUNTER — APPOINTMENT (OUTPATIENT)
Dept: VASCULAR SURGERY | Facility: CLINIC | Age: 79
End: 2024-05-15
Payer: MEDICARE

## 2024-05-15 VITALS
HEART RATE: 93 BPM | WEIGHT: 110 LBS | SYSTOLIC BLOOD PRESSURE: 157 MMHG | BODY MASS INDEX: 18.55 KG/M2 | DIASTOLIC BLOOD PRESSURE: 74 MMHG | HEIGHT: 64.5 IN

## 2024-05-15 PROCEDURE — 99213 OFFICE O/P EST LOW 20 MIN: CPT

## 2024-05-20 ENCOUNTER — APPOINTMENT (OUTPATIENT)
Dept: VASCULAR SURGERY | Facility: CLINIC | Age: 79
End: 2024-05-20
Payer: MEDICARE

## 2024-05-20 PROCEDURE — 29580 STRAPPING UNNA BOOT: CPT | Mod: RT

## 2024-05-20 PROCEDURE — 99213 OFFICE O/P EST LOW 20 MIN: CPT | Mod: 25

## 2024-05-20 NOTE — ASSESSMENT
[FreeTextEntry1] : 80 y/o F w/ R GSV reflux, bilt asymptomatic varicose and spider veins, R foot ulcer with chronic bilateral edema and chronic venous stasis. New R medial ankle large venous ulcer with mild signs of cellulitis and stasis dermatitis.  On exam, scattered spider veins on both LEs and RLE with mildly bulging varicose vein on the distal calf. Skin on both calves with mild hyperpigmentation changes c/w venous stasis discoloration and on the right side stasis dermatitis. Bilt mild edema, improved on the right calf. Superficial wound on R medial ankle, irregular borders and base with mixed granulation and small film of fibrinous tissue with mild erythema and shrouding maceration, scant serosang drainage on dressing, approx 2x1.5cm (slight improvement since last visit. Surrounding erythema with mild improvement.  Wound care provided and unna boot reapplied.  Pt is a candidate for R GSV RFA per previous duplex however size vein 4.1cm, Will consider proceeding depending on wound progress. In the interim, pt recommended to rest with the legs elevated above the chest level, limit standing or standing, and may continue daily walking. Also, continue Keflex 250mg. Pt to return Fri [Arterial/Venous Disease] : arterial/venous disease [Medication Management] : medication management [Foot care/Footwear] : foot care/footwear [Ulcer Care] : ulcer care

## 2024-05-20 NOTE — HISTORY OF PRESENT ILLNESS
[FreeTextEntry1] : 78 y/o F referred by Dr Ordoñez, She has a PMHx of HLD, hypercalcemia, Schamberg's, Rosacea, R GSV reflux, bilt asymptomatic varicose and spider veins, R foot ulcer with chronic bilateral edema and chronic venous stasis.    Pt presented a couple of weeks ago with worsening R ankle wound. She was started on Keflex and unna boot was placed. Today, she presents for change of the unna boot. She explains the one from last week seems to have shifted and the front part of the foot hurts as well as the heel. She is also concerned bc there is some drainage on the dressing. Denies any fever/chills, trouble with the antibiotic.   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  - Lives locally (within 20 blocks)

## 2024-05-20 NOTE — PHYSICAL EXAM
[Respiratory Effort] : normal respiratory effort [Ankle Swelling (On Exam)] : present [Ankle Swelling Bilaterally] : bilaterally  [Varicose Veins Of Lower Extremities] : present [Varicose Veins Of The Right Leg] : of the right leg [Ankle Swelling On The Right] : mild [] : bilaterally [Ankle Swelling On The Left] : moderate [Alert] : alert [Oriented to Person] : oriented to person [Oriented to Place] : oriented to place [Oriented to Time] : oriented to time [Calm] : calm [de-identified] : WN/WD [FreeTextEntry1] : Scattered spider veins on both LEs and RLE with mildly bulging varicose vein on the distal calf. Skin on both calves with mild hyperpigmentation changes c/w venous stasis discoloration and on the right side stasis dermatitis. Bilt mild edema, improved on the right calf. Superficial wound on R medial ankle, irregular borders and base with mixed granulation and small film of fibrinous tissue with mild erythema and shrouding maceration, scant serosang drainage on dressing, approx 2x1.5cm (slight improvement since last visit. Surrounding erythema with mild improvement. [de-identified] : FROM [de-identified] : See cardiovascular section

## 2024-05-20 NOTE — PROCEDURE
[FreeTextEntry1] : Wound care provided: wound cleaned with peroxide and hibiclens soap. AquaCell [employer] foam applied to the wound, hydrocortisone aroudn the wound, Calamine unna boot was strapped and secured with kerlix and ace

## 2024-05-22 NOTE — PROCEDURE
[FreeTextEntry1] : Wound care provided: wound cleaned with peroxide, Silvadene ointment applied to the wound, Calamine unna boot was strapped and secured with kerlix and ace

## 2024-05-22 NOTE — ADDENDUM
[FreeTextEntry1] : I, Dr. Gee Saldivar, personally performed the evaluation and management (E/M) services for this established patient who presents today with (an) existing condition(s).  That E/M includes conducting the examination, assessing all conditions, and (re)establishing/reinforcing a plan of care.  Today, my ACP, Asmita BAY, was here to observe my evaluation and management services for this condition to be followed going forward.

## 2024-05-22 NOTE — PHYSICAL EXAM
[Respiratory Effort] : normal respiratory effort [Ankle Swelling (On Exam)] : present [Ankle Swelling Bilaterally] : bilaterally  [Varicose Veins Of The Right Leg] : of the right leg [Varicose Veins Of Lower Extremities] : present [Ankle Swelling On The Right] : mild [] : bilaterally [Ankle Swelling On The Left] : moderate [Alert] : alert [Oriented to Person] : oriented to person [Oriented to Place] : oriented to place [Oriented to Time] : oriented to time [Calm] : calm [de-identified] : WN/WD [de-identified] : FROM [FreeTextEntry1] : Scattered spider veins on both LEs and RLE with mildly bulging varicose vein on the distal calf. Skin on both calves with mild hyperpigmentation changes c/w venous stasis discoloration and on the right side stasis dermatitis. Bilt mild edema, improved on the right calf. Superficial wound on R medial ankle, irregular borders and base with mixed granulation and small film of fibrinous tissue with mild erythema, approx 2.5x1.5cm (slight improvement since last visit. Surrounding erythema with mild improvement. [de-identified] : See cardiovascular section

## 2024-05-22 NOTE — ASSESSMENT
[Arterial/Venous Disease] : arterial/venous disease [Medication Management] : medication management [Foot care/Footwear] : foot care/footwear [Ulcer Care] : ulcer care [FreeTextEntry1] : 78 y/o F w/ R GSV reflux, bilt asymptomatic varicose and spider veins, R foot ulcer with chronic bilateral edema and chronic venous stasis. New R medial ankle large venous ulcer with mild signs of cellulitis and stasis dermatitis.  On exam, scattered spider veins on both LEs and RLE with mildly bulging varicose vein on the distal calf. Skin on both calves with mild hyperpigmentation changes c/w venous stasis discoloration and on the right side stasis dermatitis. Bilt mild edema, improved on the right calf. Superficial wound on R medial ankle, irregular borders and base with mixed granulation and small film of fibrinous tissue with mild erythema, approx 2.5x1.5cm (slight improvement since last visit. Surrounding erythema with mild improvement.  Wound care provided and unna boot applied.  Pt is a candidate for R GSV RFA per previous duplex however size vein 4.1 mm, Will consider proceeding depending on wound progress. In the interim, pt recommended to rest with the legs elevated above the chest level, limit standing or standing, and may continue daily walking. Also, continue Keflex 250mg for another week. Pt to return next week.

## 2024-05-22 NOTE — HISTORY OF PRESENT ILLNESS
[FreeTextEntry1] : 78 y/o F referred by Dr Ordoñez, She has a PMHx of HLD, hypercalcemia, Schamberg's, Rosacea, R GSV reflux, bilt asymptomatic varicose and spider veins, R foot ulcer with chronic bilateral edema and chronic venous stasis.    Pt presented a week ago with worsening R ankle wound. She was started on Keflex and unna boot was placed. Today, she presents for change of the unna boot. Denies any fever/chills, trouble with the antibiotic.   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  - Lives locally (within 20 blocks)

## 2024-05-24 ENCOUNTER — APPOINTMENT (OUTPATIENT)
Dept: VASCULAR SURGERY | Facility: CLINIC | Age: 79
End: 2024-05-24

## 2024-05-24 ENCOUNTER — INPATIENT (INPATIENT)
Facility: HOSPITAL | Age: 79
LOS: 9 days | Discharge: HOME HEALTH | DRG: 603 | End: 2024-06-03
Attending: SURGERY | Admitting: SURGERY
Payer: MEDICARE

## 2024-05-24 VITALS
DIASTOLIC BLOOD PRESSURE: 73 MMHG | HEART RATE: 94 BPM | WEIGHT: 110.89 LBS | RESPIRATION RATE: 17 BRPM | OXYGEN SATURATION: 97 % | TEMPERATURE: 98 F | SYSTOLIC BLOOD PRESSURE: 164 MMHG | HEIGHT: 64 IN

## 2024-05-24 DIAGNOSIS — L98.499 NON-PRESSURE CHRONIC ULCER OF SKIN OF OTHER SITES WITH UNSPECIFIED SEVERITY: ICD-10-CM

## 2024-05-24 DIAGNOSIS — L08.9 NON-PRESSURE CHRONIC ULCER OF SKIN OF OTHER SITES WITH UNSPECIFIED SEVERITY: ICD-10-CM

## 2024-05-24 LAB
ANION GAP SERPL CALC-SCNC: 14 MMOL/L — SIGNIFICANT CHANGE UP (ref 5–17)
BASOPHILS # BLD AUTO: 0.1 K/UL — SIGNIFICANT CHANGE UP (ref 0–0.2)
BASOPHILS NFR BLD AUTO: 0.9 % — SIGNIFICANT CHANGE UP (ref 0–2)
BUN SERPL-MCNC: 14 MG/DL — SIGNIFICANT CHANGE UP (ref 7–23)
CALCIUM SERPL-MCNC: 10 MG/DL — SIGNIFICANT CHANGE UP (ref 8.4–10.5)
CHLORIDE SERPL-SCNC: 98 MMOL/L — SIGNIFICANT CHANGE UP (ref 96–108)
CO2 SERPL-SCNC: 24 MMOL/L — SIGNIFICANT CHANGE UP (ref 22–31)
CREAT SERPL-MCNC: 0.6 MG/DL — SIGNIFICANT CHANGE UP (ref 0.5–1.3)
CRP SERPL-MCNC: 15.7 MG/L — HIGH (ref 0–4)
EGFR: 92 ML/MIN/1.73M2 — SIGNIFICANT CHANGE UP
EOSINOPHIL # BLD AUTO: 0.37 K/UL — SIGNIFICANT CHANGE UP (ref 0–0.5)
EOSINOPHIL NFR BLD AUTO: 3.4 % — SIGNIFICANT CHANGE UP (ref 0–6)
ERYTHROCYTE [SEDIMENTATION RATE] IN BLOOD: 31 MM/HR — HIGH
GLUCOSE SERPL-MCNC: 106 MG/DL — HIGH (ref 70–99)
HCT VFR BLD CALC: 36.6 % — SIGNIFICANT CHANGE UP (ref 34.5–45)
HGB BLD-MCNC: 12.2 G/DL — SIGNIFICANT CHANGE UP (ref 11.5–15.5)
IMM GRANULOCYTES NFR BLD AUTO: 0.2 % — SIGNIFICANT CHANGE UP (ref 0–0.9)
LACTATE SERPL-SCNC: 0.7 MMOL/L — SIGNIFICANT CHANGE UP (ref 0.5–2)
LYMPHOCYTES # BLD AUTO: 1.43 K/UL — SIGNIFICANT CHANGE UP (ref 1–3.3)
LYMPHOCYTES # BLD AUTO: 13.3 % — SIGNIFICANT CHANGE UP (ref 13–44)
MCHC RBC-ENTMCNC: 29.9 PG — SIGNIFICANT CHANGE UP (ref 27–34)
MCHC RBC-ENTMCNC: 33.3 GM/DL — SIGNIFICANT CHANGE UP (ref 32–36)
MCV RBC AUTO: 89.7 FL — SIGNIFICANT CHANGE UP (ref 80–100)
MONOCYTES # BLD AUTO: 0.75 K/UL — SIGNIFICANT CHANGE UP (ref 0–0.9)
MONOCYTES NFR BLD AUTO: 7 % — SIGNIFICANT CHANGE UP (ref 2–14)
NEUTROPHILS # BLD AUTO: 8.1 K/UL — HIGH (ref 1.8–7.4)
NEUTROPHILS NFR BLD AUTO: 75.2 % — SIGNIFICANT CHANGE UP (ref 43–77)
NRBC # BLD: 0 /100 WBCS — SIGNIFICANT CHANGE UP (ref 0–0)
PLATELET # BLD AUTO: 370 K/UL — SIGNIFICANT CHANGE UP (ref 150–400)
POTASSIUM SERPL-MCNC: 3.9 MMOL/L — SIGNIFICANT CHANGE UP (ref 3.5–5.3)
POTASSIUM SERPL-SCNC: 3.9 MMOL/L — SIGNIFICANT CHANGE UP (ref 3.5–5.3)
RBC # BLD: 4.08 M/UL — SIGNIFICANT CHANGE UP (ref 3.8–5.2)
RBC # FLD: 13.2 % — SIGNIFICANT CHANGE UP (ref 10.3–14.5)
SODIUM SERPL-SCNC: 136 MMOL/L — SIGNIFICANT CHANGE UP (ref 135–145)
WBC # BLD: 10.77 K/UL — HIGH (ref 3.8–10.5)
WBC # FLD AUTO: 10.77 K/UL — HIGH (ref 3.8–10.5)

## 2024-05-24 PROCEDURE — 71045 X-RAY EXAM CHEST 1 VIEW: CPT | Mod: 26

## 2024-05-24 PROCEDURE — 99285 EMERGENCY DEPT VISIT HI MDM: CPT | Mod: FS

## 2024-05-24 PROCEDURE — 99213 OFFICE O/P EST LOW 20 MIN: CPT

## 2024-05-24 RX ORDER — ATORVASTATIN CALCIUM 80 MG/1
20 TABLET, FILM COATED ORAL AT BEDTIME
Refills: 0 | Status: DISCONTINUED | OUTPATIENT
Start: 2024-05-24 | End: 2024-06-03

## 2024-05-24 RX ORDER — PIPERACILLIN AND TAZOBACTAM 4; .5 G/20ML; G/20ML
3.38 INJECTION, POWDER, LYOPHILIZED, FOR SOLUTION INTRAVENOUS EVERY 8 HOURS
Refills: 0 | Status: DISCONTINUED | OUTPATIENT
Start: 2024-05-24 | End: 2024-05-27

## 2024-05-24 RX ORDER — PIPERACILLIN AND TAZOBACTAM 4; .5 G/20ML; G/20ML
3.38 INJECTION, POWDER, LYOPHILIZED, FOR SOLUTION INTRAVENOUS ONCE
Refills: 0 | Status: COMPLETED | OUTPATIENT
Start: 2024-05-24 | End: 2024-05-24

## 2024-05-24 RX ORDER — VANCOMYCIN HCL 1 G
750 VIAL (EA) INTRAVENOUS ONCE
Refills: 0 | Status: COMPLETED | OUTPATIENT
Start: 2024-05-24 | End: 2024-05-24

## 2024-05-24 RX ORDER — HEPARIN SODIUM 5000 [USP'U]/ML
5000 INJECTION INTRAVENOUS; SUBCUTANEOUS EVERY 12 HOURS
Refills: 0 | Status: DISCONTINUED | OUTPATIENT
Start: 2024-05-24 | End: 2024-06-03

## 2024-05-24 RX ORDER — VANCOMYCIN HCL 1 G
750 VIAL (EA) INTRAVENOUS ONCE
Refills: 0 | Status: DISCONTINUED | OUTPATIENT
Start: 2024-05-25 | End: 2024-05-25

## 2024-05-24 RX ORDER — VANCOMYCIN HCL 1 G
1000 VIAL (EA) INTRAVENOUS ONCE
Refills: 0 | Status: DISCONTINUED | OUTPATIENT
Start: 2024-05-24 | End: 2024-05-24

## 2024-05-24 RX ORDER — ONDANSETRON 8 MG/1
4 TABLET, FILM COATED ORAL EVERY 6 HOURS
Refills: 0 | Status: DISCONTINUED | OUTPATIENT
Start: 2024-05-24 | End: 2024-06-03

## 2024-05-24 RX ORDER — VANCOMYCIN HCL 1 G
750 VIAL (EA) INTRAVENOUS EVERY 12 HOURS
Refills: 0 | Status: DISCONTINUED | OUTPATIENT
Start: 2024-05-25 | End: 2024-05-24

## 2024-05-24 RX ORDER — ACETAMINOPHEN 500 MG
325 TABLET ORAL EVERY 4 HOURS
Refills: 0 | Status: DISCONTINUED | OUTPATIENT
Start: 2024-05-24 | End: 2024-06-03

## 2024-05-24 RX ADMIN — HEPARIN SODIUM 5000 UNIT(S): 5000 INJECTION INTRAVENOUS; SUBCUTANEOUS at 22:50

## 2024-05-24 RX ADMIN — Medication 250 MILLIGRAM(S): at 15:36

## 2024-05-24 RX ADMIN — PIPERACILLIN AND TAZOBACTAM 25 GRAM(S): 4; .5 INJECTION, POWDER, LYOPHILIZED, FOR SOLUTION INTRAVENOUS at 22:50

## 2024-05-24 RX ADMIN — PIPERACILLIN AND TAZOBACTAM 200 GRAM(S): 4; .5 INJECTION, POWDER, LYOPHILIZED, FOR SOLUTION INTRAVENOUS at 14:07

## 2024-05-24 RX ADMIN — ATORVASTATIN CALCIUM 20 MILLIGRAM(S): 80 TABLET, FILM COATED ORAL at 22:50

## 2024-05-24 NOTE — H&P ADULT - HISTORY OF PRESENT ILLNESS
HPI: 78 y/o F with PMHx of HLD, hypercalcemia, Schamberg's, Rosacea, R GSV reflux, bilateral asymptomatic varicose and spider veins, presenting with infected right medial malleolar venous ulcer, patient seen earlier in clinic today and sent to ED for admission and antibiotics. As per patient the wound has been present for over 6 months, she was been compliant with dressings (weekly unaboot) and recently prescribed Keflex.     Seen in the ED, patient afebrile, normotensive, right medial malleolar ulcer measuring about 3 x4 cm, with fibrinous background, non tender, foul smelling (culture sent), bilateral hyperpigmentation on lower extremities.     PMH:  HLD, hypercalcemia, Schamberg's, Rosacea  PSH: Denies any surgeries   Allergies: Latex, minocycline   Social: denies smoking, alcohol and using drugs   Family Hx: breast cancer (mother)  Medications: Multivitamins, Lipitor 20 mg      PAST MEDICAL & SURGICAL HISTORY:      MEDICATIONS  (STANDING):  atorvastatin 20 milliGRAM(s) Oral at bedtime  heparin   Injectable 5000 Unit(s) SubCutaneous every 12 hours  multivitamin 1 Tablet(s) Oral daily  piperacillin/tazobactam IVPB.. 3.375 Gram(s) IV Intermittent every 8 hours  vancomycin  IVPB 750 milliGRAM(s) IV Intermittent every 12 hours  vancomycin  IVPB 750 milliGRAM(s) IV Intermittent Once    MEDICATIONS  (PRN):  acetaminophen     Tablet .. 325 milliGRAM(s) Oral every 4 hours PRN Mild Pain (1 - 3)  ondansetron Injectable 4 milliGRAM(s) IV Push every 6 hours PRN Nausea      Allergies    latex (Rash)  minocycline (Rash

## 2024-05-24 NOTE — ED ADULT NURSE NOTE - OBJECTIVE STATEMENT
80 y/o F c/o RLE burning sensation and pain. Pt referred to ED by Janna GIBSON for vascular evaluation. Ace wrap and kirlex applied to pt's RLE by outpatient provider. Pt denies fever, chills (endorses using Motrin), chest pain, SOB, N/V/D, numbness, tingling. PT A&Ox4, respirations even and unlabored, skin color WDL warm and dry, pt is ambulatory with a steady gait. No acute distress observed.

## 2024-05-24 NOTE — ED ADULT TRIAGE NOTE - CHIEF COMPLAINT QUOTE
pt sent to ED by MD Saldivar  as per note " pt has cellulitis to the RLE and needs vascular evaluation"

## 2024-05-24 NOTE — H&P ADULT - ASSESSMENT
80 y/o F with PMHx of HLD, hypercalcemia, Schamberg's, Rosacea, R GSV reflux, known to the vascular surgery service, being admitted for infected right medial malleolar venous ulcer, In the ED, patient afebrile, normotensive, right medial malleolar ulcer measuring about 3 x4 cm, with fibrinous background, non tender, foul smelling (culture sent), bilateral hyperpigmentation on lower extremities. WBC 10.77 Carmella 75.2 Hb 12.2 BUN 14 Cr 0.6 CRP 15.7.     Plan   Admit to vascular surgery   TID dressing changes with Jose Maria's solutions (ordered)   Wound culture sent on 5/24 follow results   Zosyn and Vancomycin IV will follow vanc through   Continue Lipitor   AM labs   Vascular surgery will follow   Plan discussed with chief resident on call

## 2024-05-24 NOTE — ED PROVIDER NOTE - CLINICAL SUMMARY MEDICAL DECISION MAKING FREE TEXT BOX
pt w/non healing venous stasis ulcer w/cellulitis, has been on keflex w/o improvement, sent in for iv abx - vasc surg. afebrile, well appearing, labs and cultures sent, abx given, will admit to vascular surg for iv abx and further management

## 2024-05-24 NOTE — ED PROVIDER NOTE - OBJECTIVE STATEMENT
The pt is a 78 y/o F, who presents to ED c/o non healing R leg ulcer x while. Pt has been seeing vascular, took keflex w/o improvement, went for f/u today and sent to ED for admission. Pt c/o non healing ulcer, pain. Denies falls, trauma, numbness or tingling to toes, fevers, chills, cp, sob.

## 2024-05-24 NOTE — PATIENT PROFILE ADULT - FALL HARM RISK - HARM RISK INTERVENTIONS
Assistance with ambulation/Assistance OOB with selected safe patient handling equipment/Communicate Risk of Fall with Harm to all staff/Discuss with provider need for PT consult/Monitor gait and stability/Reinforce activity limits and safety measures with patient and family/Tailored Fall Risk Interventions/Visual Cue: Yellow wristband and red socks/Bed in lowest position, wheels locked, appropriate side rails in place/Call bell, personal items and telephone in reach/Instruct patient to call for assistance before getting out of bed or chair/Non-slip footwear when patient is out of bed/[place] to call system/Physically safe environment - no spills, clutter or unnecessary equipment/Purposeful Proactive Rounding/Room/bathroom lighting operational, light cord in reach

## 2024-05-24 NOTE — H&P ADULT - IN ACCORDANCE WITH [place] STATE LAW, WE OFFER EVERY PATIENT A HEPATITIS C TEST. WOULD YOU LIKE TO BE TESTED TODAY?
WOUND VAC CHANGED TODAY AND DRAINAGE HAS A VERY FOUL ODOR TO IT. ANY ORDER CHANGES? [phone] Opt out

## 2024-05-24 NOTE — H&P ADULT - NSHPPHYSICALEXAM_GEN_ALL_CORE
ital Signs Last 24 Hrs  T(C): 36.7 (24 May 2024 13:35), Max: 36.7 (24 May 2024 12:25)  T(F): 98.1 (24 May 2024 13:35), Max: 98.1 (24 May 2024 12:25)  HR: 89 (24 May 2024 13:35) (89 - 94)  BP: 161/72 (24 May 2024 13:35) (161/72 - 164/73)  BP(mean): --  RR: 18 (24 May 2024 13:35) (17 - 18)  SpO2: 98% (24 May 2024 13:35) (97% - 98%)    Parameters below as of 24 May 2024 13:35  Patient On (Oxygen Delivery Method): room air        I&O's Summary      Physical Exam:  General: NAD, resting comfortably  HEENT: NC/AT, EOMI, normal hearing, no oral lesions, no LAD, neck supple  Pulmonary: normal resp effort, CTA-B  Cardiovascular: NSR, no murmurs  Abdominal: soft, ND/NT, no organomegaly  Extremities: WWP, normal strength, no clubbing/cyanosis/edema  Neuro: A/O x 3, CNs II-XII grossly intact, normal sensation, no focal deficits  Pulses: palpable distal pulses

## 2024-05-24 NOTE — ED ADULT NURSE NOTE - CHPI ED NUR SYMPTOMS NEG
Continue Regimen: fluorouracil 5 % topical cream Bid x 4 wks (for 3 weeks to complete course)\\nQuantity: 40.0 g  Days Supply: 30\\nSig: Apply with qtip to AA x4 wks as tolerated
Detail Level: Zone
Render In Strict Bullet Format?: No
no chills/no decreased eating/drinking/no dizziness/no fever/no nausea/no tingling/no vomiting/no weakness

## 2024-05-24 NOTE — PHYSICAL EXAM
[Respiratory Effort] : normal respiratory effort [Ankle Swelling (On Exam)] : present [Ankle Swelling Bilaterally] : bilaterally  [Varicose Veins Of Lower Extremities] : present [Varicose Veins Of The Right Leg] : of the right leg [Ankle Swelling On The Right] : mild [] : bilaterally [Ankle Swelling On The Left] : moderate [Alert] : alert [Oriented to Person] : oriented to person [Oriented to Place] : oriented to place [Oriented to Time] : oriented to time [Calm] : calm [de-identified] : WN/WD [FreeTextEntry1] : Scattered spider veins on both LEs and RLE with mildly bulging varicose vein on the distal calf. Skin on both calves with mild hyperpigmentation changes c/w venous stasis discoloration and on the right side stasis dermatitis. Bilt mild edema, improved on the right calf. Superficial wound on R medial ankle, irregular borders and base with mixed granulation and small film of fibrinous tissue with mild erythema and shrouding maceration, scant serosang drainage on dressing, approx 2x1.5cm (slight improvement since last visit. Surrounding erythema with mild improvement. [de-identified] : FROM [de-identified] : See cardiovascular section

## 2024-05-24 NOTE — ED PROVIDER NOTE - MUSCULOSKELETAL, MLM
Spine appears normal, range of motion is not limited, no muscle or joint tenderness; + dressing in place to R LE - was just applied by vascular team, will defer exam to vascular

## 2024-05-24 NOTE — H&P ADULT - NSHPLABSRESULTS_GEN_ALL_CORE
LABS:                        12.2   10.77 )-----------( 370      ( 24 May 2024 13:39 )             36.6     05-24    136  |  98  |  14  ----------------------------<  106<H>  3.9   |  24  |  0.60    Ca    10.0      24 May 2024 13:39        Urinalysis Basic - ( 24 May 2024 13:39 )    Color: x / Appearance: x / SG: x / pH: x  Gluc: 106 mg/dL / Ketone: x  / Bili: x / Urobili: x   Blood: x / Protein: x / Nitrite: x   Leuk Esterase: x / RBC: x / WBC x   Sq Epi: x / Non Sq Epi: x / Bacteria: x

## 2024-05-24 NOTE — ED PROVIDER NOTE - ATTENDING APP SHARED VISIT CONTRIBUTION OF CARE
Mild HTN, other vitals wnl. Exam as above.  Pt does not want dressing removed in ED.   Sent by vascular.   Will begin work up as requested by specialist and admit for further care. Clinically no indication for other further emergent ED workup or intervention at this time.

## 2024-05-24 NOTE — ED ADULT NURSE NOTE - NSFALLUNIVINTERV_ED_ALL_ED
none Bed/Stretcher in lowest position, wheels locked, appropriate side rails in place/Call bell, personal items and telephone in reach/Instruct patient to call for assistance before getting out of bed/chair/stretcher/Non-slip footwear applied when patient is off stretcher/[place] to call system/Physically safe environment - no spills, clutter or unnecessary equipment/Purposeful proactive rounding/Room/bathroom lighting operational, light cord in reach

## 2024-05-24 NOTE — ED PROVIDER NOTE - CARE PLAN
Principal Discharge DX:	Cellulitis of right leg  Secondary Diagnosis:	Venous stasis ulcer without varicose veins   1

## 2024-05-24 NOTE — ASSESSMENT
[Arterial/Venous Disease] : arterial/venous disease [Medication Management] : medication management [Foot care/Footwear] : foot care/footwear [Ulcer Care] : ulcer care [FreeTextEntry1] : 78 y/o F w/ R GSV reflux, bilt asymptomatic varicose and spider veins, R foot ulcer with chronic bilateral edema and chronic venous stasis. New R medial ankle large venous ulcer with mild signs of cellulitis and stasis dermatitis.  On exam, scattered spider veins on both LEs and RLE with mildly bulging varicose vein on the distal calf. Skin on both calves with mild hyperpigmentation changes c/w venous stasis discoloration and on the right side stasis dermatitis. Bilt mild edema, improved on the right calf. Superficial wound on R medial ankle, irregular borders and base with mixed granulation and small film of fibrinous tissue with mild erythema and shrouding maceration, scant serosang drainage on dressing, approx 2x1.5cm (slight improvement since last visit. Surrounding erythema with mild improvement.  Wound care provided and unna boot reapplied.  Pt is a candidate for R GSV RFA per previous duplex however size vein 4.1cm, Will consider proceeding depending on wound progress. In the interim, pt recommended to rest with the legs elevated above the chest level, limit standing or standing, and may continue daily walking. Also, continue Keflex 250mg. Pt to return Fri

## 2024-05-25 LAB
ANION GAP SERPL CALC-SCNC: 15 MMOL/L — SIGNIFICANT CHANGE UP (ref 5–17)
BASOPHILS # BLD AUTO: 0.11 K/UL — SIGNIFICANT CHANGE UP (ref 0–0.2)
BASOPHILS NFR BLD AUTO: 1.5 % — SIGNIFICANT CHANGE UP (ref 0–2)
BUN SERPL-MCNC: 12 MG/DL — SIGNIFICANT CHANGE UP (ref 7–23)
CALCIUM SERPL-MCNC: 9.8 MG/DL — SIGNIFICANT CHANGE UP (ref 8.4–10.5)
CHLORIDE SERPL-SCNC: 105 MMOL/L — SIGNIFICANT CHANGE UP (ref 96–108)
CO2 SERPL-SCNC: 16 MMOL/L — LOW (ref 22–31)
CREAT SERPL-MCNC: 0.63 MG/DL — SIGNIFICANT CHANGE UP (ref 0.5–1.3)
EGFR: 91 ML/MIN/1.73M2 — SIGNIFICANT CHANGE UP
EOSINOPHIL # BLD AUTO: 0.47 K/UL — SIGNIFICANT CHANGE UP (ref 0–0.5)
EOSINOPHIL NFR BLD AUTO: 6.4 % — HIGH (ref 0–6)
GLUCOSE SERPL-MCNC: 87 MG/DL — SIGNIFICANT CHANGE UP (ref 70–99)
HCT VFR BLD CALC: 39.8 % — SIGNIFICANT CHANGE UP (ref 34.5–45)
HGB BLD-MCNC: 12.3 G/DL — SIGNIFICANT CHANGE UP (ref 11.5–15.5)
IMM GRANULOCYTES NFR BLD AUTO: 0.4 % — SIGNIFICANT CHANGE UP (ref 0–0.9)
LYMPHOCYTES # BLD AUTO: 1.39 K/UL — SIGNIFICANT CHANGE UP (ref 1–3.3)
LYMPHOCYTES # BLD AUTO: 19 % — SIGNIFICANT CHANGE UP (ref 13–44)
MAGNESIUM SERPL-MCNC: 2.1 MG/DL — SIGNIFICANT CHANGE UP (ref 1.6–2.6)
MCHC RBC-ENTMCNC: 29.9 PG — SIGNIFICANT CHANGE UP (ref 27–34)
MCHC RBC-ENTMCNC: 30.9 GM/DL — LOW (ref 32–36)
MCV RBC AUTO: 96.6 FL — SIGNIFICANT CHANGE UP (ref 80–100)
MONOCYTES # BLD AUTO: 0.79 K/UL — SIGNIFICANT CHANGE UP (ref 0–0.9)
MONOCYTES NFR BLD AUTO: 10.8 % — SIGNIFICANT CHANGE UP (ref 2–14)
NEUTROPHILS # BLD AUTO: 4.54 K/UL — SIGNIFICANT CHANGE UP (ref 1.8–7.4)
NEUTROPHILS NFR BLD AUTO: 61.9 % — SIGNIFICANT CHANGE UP (ref 43–77)
NRBC # BLD: 0 /100 WBCS — SIGNIFICANT CHANGE UP (ref 0–0)
PHOSPHATE SERPL-MCNC: 3 MG/DL — SIGNIFICANT CHANGE UP (ref 2.5–4.5)
PLATELET # BLD AUTO: 246 K/UL — SIGNIFICANT CHANGE UP (ref 150–400)
POTASSIUM SERPL-MCNC: 4.4 MMOL/L — SIGNIFICANT CHANGE UP (ref 3.5–5.3)
POTASSIUM SERPL-SCNC: 4.4 MMOL/L — SIGNIFICANT CHANGE UP (ref 3.5–5.3)
RBC # BLD: 4.12 M/UL — SIGNIFICANT CHANGE UP (ref 3.8–5.2)
RBC # FLD: 13.2 % — SIGNIFICANT CHANGE UP (ref 10.3–14.5)
SODIUM SERPL-SCNC: 136 MMOL/L — SIGNIFICANT CHANGE UP (ref 135–145)
WBC # BLD: 7.33 K/UL — SIGNIFICANT CHANGE UP (ref 3.8–10.5)
WBC # FLD AUTO: 7.33 K/UL — SIGNIFICANT CHANGE UP (ref 3.8–10.5)

## 2024-05-25 PROCEDURE — 99223 1ST HOSP IP/OBS HIGH 75: CPT

## 2024-05-25 RX ORDER — POLYETHYLENE GLYCOL 3350 17 G/17G
17 POWDER, FOR SOLUTION ORAL DAILY
Refills: 0 | Status: DISCONTINUED | OUTPATIENT
Start: 2024-05-25 | End: 2024-06-03

## 2024-05-25 RX ORDER — VANCOMYCIN HCL 1 G
750 VIAL (EA) INTRAVENOUS ONCE
Refills: 0 | Status: COMPLETED | OUTPATIENT
Start: 2024-05-25 | End: 2024-05-25

## 2024-05-25 RX ADMIN — ATORVASTATIN CALCIUM 20 MILLIGRAM(S): 80 TABLET, FILM COATED ORAL at 23:09

## 2024-05-25 RX ADMIN — POLYETHYLENE GLYCOL 3350 17 GRAM(S): 17 POWDER, FOR SOLUTION ORAL at 23:09

## 2024-05-25 RX ADMIN — PIPERACILLIN AND TAZOBACTAM 25 GRAM(S): 4; .5 INJECTION, POWDER, LYOPHILIZED, FOR SOLUTION INTRAVENOUS at 14:55

## 2024-05-25 RX ADMIN — PIPERACILLIN AND TAZOBACTAM 25 GRAM(S): 4; .5 INJECTION, POWDER, LYOPHILIZED, FOR SOLUTION INTRAVENOUS at 23:09

## 2024-05-25 RX ADMIN — Medication 250 MILLIGRAM(S): at 12:59

## 2024-05-25 RX ADMIN — PIPERACILLIN AND TAZOBACTAM 25 GRAM(S): 4; .5 INJECTION, POWDER, LYOPHILIZED, FOR SOLUTION INTRAVENOUS at 05:55

## 2024-05-25 RX ADMIN — Medication 1 TABLET(S): at 11:50

## 2024-05-25 RX ADMIN — HEPARIN SODIUM 5000 UNIT(S): 5000 INJECTION INTRAVENOUS; SUBCUTANEOUS at 23:09

## 2024-05-25 RX ADMIN — HEPARIN SODIUM 5000 UNIT(S): 5000 INJECTION INTRAVENOUS; SUBCUTANEOUS at 10:46

## 2024-05-25 NOTE — CONSULT NOTE ADULT - SUBJECTIVE AND OBJECTIVE BOX
INTERNAL MEDICINE SERVICE INITIAL CONSULT NOTE    HPI:  HPI: 80 y/o F with PMHx of HLD, hypercalcemia, Schamberg's, Rosacea, R GSV reflux, bilateral asymptomatic varicose and spider veins, presenting with infected right medial malleolar venous ulcer, patient seen earlier in clinic today and sent to ED for admission and antibiotics. As per patient the wound has been present for over 6 months, she was been compliant with dressings (weekly unaboot) and recently prescribed Keflex.     Seen in the ED, patient afebrile, normotensive, right medial malleolar ulcer measuring about 3 x4 cm, with fibrinous background, non tender, foul smelling (culture sent), bilateral hyperpigmentation on lower extremities.     PMH:  HLD, hypercalcemia, Schamberg's, Rosacea  PSH: Denies any surgeries   Allergies: Latex, minocycline   Social: denies smoking, alcohol and using drugs   Family Hx: breast cancer (mother)  Medications: Multivitamins, Lipitor 20 mg      PAST MEDICAL & SURGICAL HISTORY:      MEDICATIONS  (STANDING):  atorvastatin 20 milliGRAM(s) Oral at bedtime  heparin   Injectable 5000 Unit(s) SubCutaneous every 12 hours  multivitamin 1 Tablet(s) Oral daily  piperacillin/tazobactam IVPB.. 3.375 Gram(s) IV Intermittent every 8 hours  vancomycin  IVPB 750 milliGRAM(s) IV Intermittent every 12 hours  vancomycin  IVPB 750 milliGRAM(s) IV Intermittent Once    MEDICATIONS  (PRN):  acetaminophen     Tablet .. 325 milliGRAM(s) Oral every 4 hours PRN Mild Pain (1 - 3)  ondansetron Injectable 4 milliGRAM(s) IV Push every 6 hours PRN Nausea      Allergies    latex (Rash)  minocycline (Rash         (24 May 2024 15:23)      ADDITIONAL MEDICINE HPI:    REVIEW OF SYSTEMS:   Otherwise negative except as specified in HPI    PAST MEDICAL HISTORY:     PAST SURGICAL HISTORY:    FAMILY HISTORY:    SOCIAL HISTORY:  Tobacco use:  EtOH use:  Illicit drug use:    MEDICATIONS:  MEDICATIONS  (STANDING):  atorvastatin 20 milliGRAM(s) Oral at bedtime  heparin   Injectable 5000 Unit(s) SubCutaneous every 12 hours  multivitamin 1 Tablet(s) Oral daily  piperacillin/tazobactam IVPB.. 3.375 Gram(s) IV Intermittent every 8 hours  polyethylene glycol 3350 17 Gram(s) Oral daily  vancomycin  IVPB 750 milliGRAM(s) IV Intermittent once    MEDICATIONS  (PRN):  acetaminophen     Tablet .. 325 milliGRAM(s) Oral every 4 hours PRN Mild Pain (1 - 3)  ondansetron Injectable 4 milliGRAM(s) IV Push every 6 hours PRN Nausea      ALLERGIES:  Allergies    latex (Rash)  minocycline (Rash)    Intolerances        VITAL SIGNS:  Vital Signs Last 24 Hrs  T(C): 36.8 (25 May 2024 08:50), Max: 36.8 (25 May 2024 08:50)  T(F): 98.3 (25 May 2024 08:50), Max: 98.3 (25 May 2024 08:50)  HR: 83 (25 May 2024 08:50) (70 - 94)  BP: 134/63 (25 May 2024 08:50) (119/58 - 164/73)  BP(mean): 84 (25 May 2024 05:53) (84 - 105)  RR: 19 (25 May 2024 08:50) (17 - 19)  SpO2: 97% (25 May 2024 08:50) (95% - 98%)    Parameters below as of 25 May 2024 08:50  Patient On (Oxygen Delivery Method): room air        05-24-24 @ 07:01  -  05-25-24 @ 07:00  --------------------------------------------------------  IN:    IV PiggyBack: 150 mL    Oral Fluid: 100 mL  Total IN: 250 mL    OUT:    Voided (mL): 600 mL  Total OUT: 600 mL    Total NET: -350 mL      05-25-24 @ 07:01  -  05-25-24 @ 11:36  --------------------------------------------------------  IN:    Oral Fluid: 200 mL  Total IN: 200 mL    OUT:  Total OUT: 0 mL    Total NET: 200 mL          PHYSICAL EXAM:  Constitutional: WDWN resting comfortably in bed; NAD  Head: NC/AT  Eyes: PERRL, EOMI, anicteric sclera  ENT: no nasal discharge; uvula midline, no oropharyngeal erythema or exudates; MMM  Neck: supple; no JVD or thyromegaly  Respiratory: CTA B/L; no W/R/R, no retractions  Cardiac: +S1/S2; RRR; no M/R/G; PMI non-displaced  Gastrointestinal: abdomen soft, NT/ND; no rebound or guarding; +BSx4  Genitourinary: normal external genitalia  Back: spine midline, no bony tenderness or step-offs; no CVAT B/L  Extremities: WWP, no clubbing or cyanosis; no peripheral edema  Musculoskeletal: NROM x4; no joint swelling, tenderness or erythema  Vascular: 2+ radial, femoral, DP/PT pulses B/L  Dermatologic: skin warm, dry and intact; no rashes, wounds, or scars  Lymphatic: no submandibular or cervical LAD  Neurologic: AAOx3; CNII-XII grossly intact; no focal deficits  Psychiatric: affect and characteristics of appearance, verbalizations, behaviors are appropriate    LABS:                        12.3   7.33  )-----------( 246      ( 25 May 2024 05:30 )             39.8     05-25    136  |  105  |  12  ----------------------------<  87  4.4   |  16<L>  |  0.63    Ca    9.8      25 May 2024 05:30  Phos  3.0     05-25  Mg     2.1     05-25        Urinalysis Basic - ( 25 May 2024 05:30 )    Color: x / Appearance: x / SG: x / pH: x  Gluc: 87 mg/dL / Ketone: x  / Bili: x / Urobili: x   Blood: x / Protein: x / Nitrite: x   Leuk Esterase: x / RBC: x / WBC x   Sq Epi: x / Non Sq Epi: x / Bacteria: x          CAPILLARY BLOOD GLUCOSE              RADIOLOGY & ADDITIONAL TESTS: Reviewed.

## 2024-05-25 NOTE — PROGRESS NOTE ADULT - SUBJECTIVE AND OBJECTIVE BOX
O/N: MALLORIE, VSS  4/24: patient admitted for infected right medial malleolar venous ulcer, wound culture sent      SUBJECTIVE: Patient seen and examined bedside by Surgical resident. States that she was able to sleep great last night. had no pain overnight or this am.     ROS other wise negative.     heparin   Injectable 5000 Unit(s) SubCutaneous every 12 hours  piperacillin/tazobactam IVPB.. 3.375 Gram(s) IV Intermittent every 8 hours  vancomycin  IVPB 750 milliGRAM(s) IV Intermittent once    MEDICATIONS  (PRN):  acetaminophen     Tablet .. 325 milliGRAM(s) Oral every 4 hours PRN Mild Pain (1 - 3)  ondansetron Injectable 4 milliGRAM(s) IV Push every 6 hours PRN Nausea      I&O's Detail    24 May 2024 07:01  -  25 May 2024 07:00  --------------------------------------------------------  IN:    IV PiggyBack: 150 mL    Oral Fluid: 100 mL  Total IN: 250 mL    OUT:    Voided (mL): 600 mL  Total OUT: 600 mL    Total NET: -350 mL          Vital Signs Last 24 Hrs  T(C): 36.7 (24 May 2024 20:59), Max: 36.7 (24 May 2024 12:25)  T(F): 98 (24 May 2024 20:59), Max: 98.1 (24 May 2024 12:25)  HR: 70 (25 May 2024 05:53) (70 - 94)  BP: 119/58 (25 May 2024 05:53) (119/58 - 164/73)  BP(mean): 84 (25 May 2024 05:53) (84 - 105)  RR: 18 (25 May 2024 05:53) (17 - 19)  SpO2: 97% (25 May 2024 05:53) (95% - 98%)    Parameters below as of 25 May 2024 05:53  Patient On (Oxygen Delivery Method): room air    Physical Exam:  General: NAD, resting comfortably in bed  Pulmonary: normal resp effort, on room air   Cardiovascular: NS HD stable  Abdominal: soft, ND/NT,   Extremities: WWP, normal strength, no clubbing/cyanosis/edema, bilateral asymptomatic varicose and spider veins, right medial malleolar venous ulcer with fibrinous growth in wound, no crepitus, drainage    Neuro: A/O x 3, CNs II-XII grossly intact, normal sensation, no focal deficits  Pulses: palpable distal pulses    LABS:                        12.3   7.33  )-----------( 246      ( 25 May 2024 05:30 )             39.8     05-25    136  |  105  |  12  ----------------------------<  87  4.4   |  16<L>  |  0.63    Ca    9.8      25 May 2024 05:30  Phos  3.0     05-25  Mg     2.1     05-25        Urinalysis Basic - ( 25 May 2024 05:30 )    Color: x / Appearance: x / SG: x / pH: x  Gluc: 87 mg/dL / Ketone: x  / Bili: x / Urobili: x   Blood: x / Protein: x / Nitrite: x   Leuk Esterase: x / RBC: x / WBC x   Sq Epi: x / Non Sq Epi: x / Bacteria: x        RADIOLOGY & ADDITIONAL STUDIES:

## 2024-05-25 NOTE — CONSULT NOTE ADULT - ASSESSMENT
80 y/o F with PMHx of HLD, hypercalcemia, Schamberg's, Rosacea, R GSV reflux, bilateral asymptomatic varicose and spider veins, presenting with infected right medial malleolar venous ulcer. Medicine following for comanagement.     #Right lower extremity ulcer   - Would care per vascular surgery   - Continue IV Vanc/Zosyn, follow up blood and wound cultures, would recommend transitioning to PO abx in 24-48 hours if clinical improvement   - Remainder of management per primary team    #HLD   - Continue lipitor   - Obtain lipid profile in AM    Dispo: pending PT and clinical improvement

## 2024-05-26 LAB
-  AMOXICILLIN/CLAVULANIC ACID: SIGNIFICANT CHANGE UP
-  AMPICILLIN/SULBACTAM: SIGNIFICANT CHANGE UP
-  AMPICILLIN: SIGNIFICANT CHANGE UP
-  CEFAZOLIN: SIGNIFICANT CHANGE UP
-  CEFEPIME: SIGNIFICANT CHANGE UP
-  CEFEPIME: SIGNIFICANT CHANGE UP
-  CEFOXITIN: SIGNIFICANT CHANGE UP
-  CEFTAZIDIME: SIGNIFICANT CHANGE UP
-  CEFTRIAXONE: SIGNIFICANT CHANGE UP
-  CIPROFLOXACIN: SIGNIFICANT CHANGE UP
-  CIPROFLOXACIN: SIGNIFICANT CHANGE UP
-  GENTAMICIN: SIGNIFICANT CHANGE UP
-  IMIPENEM: SIGNIFICANT CHANGE UP
-  LEVOFLOXACIN: SIGNIFICANT CHANGE UP
-  LEVOFLOXACIN: SIGNIFICANT CHANGE UP
-  MEROPENEM: SIGNIFICANT CHANGE UP
-  PIPERACILLIN/TAZOBACTAM: SIGNIFICANT CHANGE UP
-  PIPERACILLIN/TAZOBACTAM: SIGNIFICANT CHANGE UP
-  TOBRAMYCIN: SIGNIFICANT CHANGE UP
-  TRIMETHOPRIM/SULFAMETHOXAZOLE: SIGNIFICANT CHANGE UP
ANION GAP SERPL CALC-SCNC: 8 MMOL/L — SIGNIFICANT CHANGE UP (ref 5–17)
BUN SERPL-MCNC: 15 MG/DL — SIGNIFICANT CHANGE UP (ref 7–23)
CALCIUM SERPL-MCNC: 9.7 MG/DL — SIGNIFICANT CHANGE UP (ref 8.4–10.5)
CHLORIDE SERPL-SCNC: 102 MMOL/L — SIGNIFICANT CHANGE UP (ref 96–108)
CO2 SERPL-SCNC: 23 MMOL/L — SIGNIFICANT CHANGE UP (ref 22–31)
CREAT SERPL-MCNC: 0.58 MG/DL — SIGNIFICANT CHANGE UP (ref 0.5–1.3)
CULTURE RESULTS: ABNORMAL
EGFR: 93 ML/MIN/1.73M2 — SIGNIFICANT CHANGE UP
GLUCOSE SERPL-MCNC: 94 MG/DL — SIGNIFICANT CHANGE UP (ref 70–99)
HCT VFR BLD CALC: 37.3 % — SIGNIFICANT CHANGE UP (ref 34.5–45)
HGB BLD-MCNC: 12.4 G/DL — SIGNIFICANT CHANGE UP (ref 11.5–15.5)
MAGNESIUM SERPL-MCNC: 2 MG/DL — SIGNIFICANT CHANGE UP (ref 1.6–2.6)
MCHC RBC-ENTMCNC: 29.6 PG — SIGNIFICANT CHANGE UP (ref 27–34)
MCHC RBC-ENTMCNC: 33.2 GM/DL — SIGNIFICANT CHANGE UP (ref 32–36)
MCV RBC AUTO: 89 FL — SIGNIFICANT CHANGE UP (ref 80–100)
METHOD TYPE: SIGNIFICANT CHANGE UP
METHOD TYPE: SIGNIFICANT CHANGE UP
NRBC # BLD: 0 /100 WBCS — SIGNIFICANT CHANGE UP (ref 0–0)
ORGANISM # SPEC MICROSCOPIC CNT: ABNORMAL
ORGANISM # SPEC MICROSCOPIC CNT: ABNORMAL
ORGANISM # SPEC MICROSCOPIC CNT: SIGNIFICANT CHANGE UP
PHOSPHATE SERPL-MCNC: 3 MG/DL — SIGNIFICANT CHANGE UP (ref 2.5–4.5)
PLATELET # BLD AUTO: 345 K/UL — SIGNIFICANT CHANGE UP (ref 150–400)
POTASSIUM SERPL-MCNC: 3.6 MMOL/L — SIGNIFICANT CHANGE UP (ref 3.5–5.3)
POTASSIUM SERPL-SCNC: 3.6 MMOL/L — SIGNIFICANT CHANGE UP (ref 3.5–5.3)
RBC # BLD: 4.19 M/UL — SIGNIFICANT CHANGE UP (ref 3.8–5.2)
RBC # FLD: 13.3 % — SIGNIFICANT CHANGE UP (ref 10.3–14.5)
SODIUM SERPL-SCNC: 133 MMOL/L — LOW (ref 135–145)
SPECIMEN SOURCE: SIGNIFICANT CHANGE UP
VANCOMYCIN TROUGH SERPL-MCNC: <4 UG/ML — LOW (ref 10–20)
WBC # BLD: 8.6 K/UL — SIGNIFICANT CHANGE UP (ref 3.8–10.5)
WBC # FLD AUTO: 8.6 K/UL — SIGNIFICANT CHANGE UP (ref 3.8–10.5)

## 2024-05-26 PROCEDURE — 99233 SBSQ HOSP IP/OBS HIGH 50: CPT

## 2024-05-26 RX ORDER — POTASSIUM CHLORIDE 20 MEQ
40 PACKET (EA) ORAL ONCE
Refills: 0 | Status: COMPLETED | OUTPATIENT
Start: 2024-05-26 | End: 2024-05-26

## 2024-05-26 RX ORDER — VANCOMYCIN HCL 1 G
1000 VIAL (EA) INTRAVENOUS EVERY 12 HOURS
Refills: 0 | Status: COMPLETED | OUTPATIENT
Start: 2024-05-26 | End: 2024-05-27

## 2024-05-26 RX ADMIN — Medication 325 MILLIGRAM(S): at 15:23

## 2024-05-26 RX ADMIN — Medication 250 MILLIGRAM(S): at 17:56

## 2024-05-26 RX ADMIN — HEPARIN SODIUM 5000 UNIT(S): 5000 INJECTION INTRAVENOUS; SUBCUTANEOUS at 11:43

## 2024-05-26 RX ADMIN — Medication 325 MILLIGRAM(S): at 16:23

## 2024-05-26 RX ADMIN — ATORVASTATIN CALCIUM 20 MILLIGRAM(S): 80 TABLET, FILM COATED ORAL at 22:15

## 2024-05-26 RX ADMIN — Medication 40 MILLIEQUIVALENT(S): at 13:05

## 2024-05-26 RX ADMIN — Medication 1 TABLET(S): at 11:43

## 2024-05-26 RX ADMIN — PIPERACILLIN AND TAZOBACTAM 25 GRAM(S): 4; .5 INJECTION, POWDER, LYOPHILIZED, FOR SOLUTION INTRAVENOUS at 06:09

## 2024-05-26 RX ADMIN — HEPARIN SODIUM 5000 UNIT(S): 5000 INJECTION INTRAVENOUS; SUBCUTANEOUS at 22:15

## 2024-05-26 RX ADMIN — PIPERACILLIN AND TAZOBACTAM 25 GRAM(S): 4; .5 INJECTION, POWDER, LYOPHILIZED, FOR SOLUTION INTRAVENOUS at 22:15

## 2024-05-26 RX ADMIN — PIPERACILLIN AND TAZOBACTAM 25 GRAM(S): 4; .5 INJECTION, POWDER, LYOPHILIZED, FOR SOLUTION INTRAVENOUS at 13:06

## 2024-05-26 NOTE — PROGRESS NOTE ADULT - SUBJECTIVE AND OBJECTIVE BOX
INTERVAL EVENTS: no acute complaints or concerns    PAST MEDICAL & SURGICAL HISTORY:  Hyperlipemia    Rosacea    Chronic venous insufficiency    Venous ulcer of right lower extremity without varicose veins        MEDICATIONS  (STANDING):  atorvastatin 20 milliGRAM(s) Oral at bedtime  heparin   Injectable 5000 Unit(s) SubCutaneous every 12 hours  multivitamin 1 Tablet(s) Oral daily  piperacillin/tazobactam IVPB.. 3.375 Gram(s) IV Intermittent every 8 hours  polyethylene glycol 3350 17 Gram(s) Oral daily    MEDICATIONS  (PRN):  acetaminophen     Tablet .. 325 milliGRAM(s) Oral every 4 hours PRN Mild Pain (1 - 3)  ondansetron Injectable 4 milliGRAM(s) IV Push every 6 hours PRN Nausea    T(F): 97.5 (05-26-24 @ 06:16), Max: 98.3 (05-25-24 @ 17:56)  HR: 78 (05-26-24 @ 06:16) (78 - 94)  BP: 134/65 (05-26-24 @ 06:16) (119/59 - 141/60)  BP(mean): 90 (05-26-24 @ 06:16) (84 - 93)  ABP: --  ABP(mean): --  RR: 18 (05-26-24 @ 06:16) (18 - 20)  SpO2: 96% (05-26-24 @ 06:16) (96% - 98%)    I/O Detail 24H    25 May 2024 07:01  -  26 May 2024 07:00  --------------------------------------------------------  IN:    IV PiggyBack: 150 mL    Oral Fluid: 1020 mL  Total IN: 1170 mL    OUT:    Voided (mL): 2550 mL  Total OUT: 2550 mL    Total NET: -1380 mL      26 May 2024 07:01  -  26 May 2024 11:40  --------------------------------------------------------  IN:    Oral Fluid: 180 mL  Total IN: 180 mL    OUT:    Voided (mL): 200 mL  Total OUT: 200 mL    Total NET: -20 mL          PHYSICAL EXAM:  GEN: NAD  HEENT: EOMI   RESP: CTA b/l  CV: RRR. Normal S1/S2. No m/r/g.  ABD: soft, non-distended  EXT: No edema   NEURO: alert and attentive    LABS:  CBC 05-26-24 @ 05:30                        12.4   8.60  )-----------( 345                   37.3       Hgb trend: 12.4 <-- , 12.3 <-- , 12.2 <--   WBC trend: 8.60 <-- , 7.33 <-- , 10.77 <--       CMP 05-26-24 @ 05:30    133<L>  |  102  |  15  ----------------------------<  94  3.6   |  23  |  0.58    Ca    9.7      05-26-24 @ 05:30  Phos  3.0     05-26  Mg     2.0     05-26        Serum Cr trend: 0.58 <-- , 0.63 <-- , 0.60 <--         Cardiac Markers           STUDIES:

## 2024-05-26 NOTE — PROGRESS NOTE ADULT - SUBJECTIVE AND OBJECTIVE BOX
ON:Blood Cx no growth at 24 hrs. dressing changed. Wound Cx growing pseudomonas and enterobacter   Subjective:  Patients dressing changed bedside on morning rounds with chief resident. NAC.    ROS:   Denies Headache, blurred vision, Chest Pain, SOB, Abdominal pain, nausea or vomiting     Social   piperacillin/tazobactam IVPB.. 3.375  vancomycin  IVPB 1000  heparin   Injectable 5000  piperacillin/tazobactam IVPB.. 3.375  vancomycin  IVPB 1000      Allergies    latex (Rash)  minocycline (Rash)    Intolerances        Vital Signs Last 24 Hrs  T(C): 36.1 (26 May 2024 12:00), Max: 36.8 (25 May 2024 17:56)  T(F): 97 (26 May 2024 12:00), Max: 98.3 (25 May 2024 17:56)  HR: 80 (26 May 2024 12:00) (78 - 94)  BP: 125/65 (26 May 2024 12:00) (124/60 - 141/60)  BP(mean): 90 (26 May 2024 06:16) (84 - 93)  RR: 17 (26 May 2024 12:00) (17 - 20)  SpO2: 96% (26 May 2024 12:00) (96% - 98%)    Parameters below as of 26 May 2024 12:00  Patient On (Oxygen Delivery Method): room air      I&O's Summary    25 May 2024 07:01  -  26 May 2024 07:00  --------------------------------------------------------  IN: 1170 mL / OUT: 2550 mL / NET: -1380 mL    26 May 2024 07:01  -  26 May 2024 16:20  --------------------------------------------------------  IN: 417 mL / OUT: 400 mL / NET: 17 mL        Physical Exam:  General: NAD, resting comfortably in bed  Pulmonary: normal resp effort, on room air   Cardiovascular: NS HD stable  Abdominal: soft, ND/NT,   Extremities: WWP, normal strength, no clubbing/cyanosis/edema, bilateral asymptomatic varicose and spider veins, right medial malleolar venous ulcer with fibrinous growth in wound, no crepitus, drainage    Neuro: A/O x 3, CNs II-XII grossly intact, normal sensation, no focal deficits  Pulses: palpable distal pulses      LABS:                        12.4   8.60  )-----------( 345      ( 26 May 2024 05:30 )             37.3     05-26    133<L>  |  102  |  15  ----------------------------<  94  3.6   |  23  |  0.58    Ca    9.7      26 May 2024 05:30  Phos  3.0     05-26  Mg     2.0     05-26          Radiology and Additional Studies:

## 2024-05-27 LAB
ANION GAP SERPL CALC-SCNC: 13 MMOL/L — SIGNIFICANT CHANGE UP (ref 5–17)
BUN SERPL-MCNC: 13 MG/DL — SIGNIFICANT CHANGE UP (ref 7–23)
CALCIUM SERPL-MCNC: 9.7 MG/DL — SIGNIFICANT CHANGE UP (ref 8.4–10.5)
CHLORIDE SERPL-SCNC: 100 MMOL/L — SIGNIFICANT CHANGE UP (ref 96–108)
CO2 SERPL-SCNC: 21 MMOL/L — LOW (ref 22–31)
CREAT SERPL-MCNC: 0.64 MG/DL — SIGNIFICANT CHANGE UP (ref 0.5–1.3)
EGFR: 90 ML/MIN/1.73M2 — SIGNIFICANT CHANGE UP
GLUCOSE SERPL-MCNC: 158 MG/DL — HIGH (ref 70–99)
HCT VFR BLD CALC: 36.5 % — SIGNIFICANT CHANGE UP (ref 34.5–45)
HGB BLD-MCNC: 12.3 G/DL — SIGNIFICANT CHANGE UP (ref 11.5–15.5)
MAGNESIUM SERPL-MCNC: 2 MG/DL — SIGNIFICANT CHANGE UP (ref 1.6–2.6)
MCHC RBC-ENTMCNC: 29.8 PG — SIGNIFICANT CHANGE UP (ref 27–34)
MCHC RBC-ENTMCNC: 33.7 GM/DL — SIGNIFICANT CHANGE UP (ref 32–36)
MCV RBC AUTO: 88.4 FL — SIGNIFICANT CHANGE UP (ref 80–100)
NRBC # BLD: 0 /100 WBCS — SIGNIFICANT CHANGE UP (ref 0–0)
PHOSPHATE SERPL-MCNC: 2.9 MG/DL — SIGNIFICANT CHANGE UP (ref 2.5–4.5)
PLATELET # BLD AUTO: 363 K/UL — SIGNIFICANT CHANGE UP (ref 150–400)
POTASSIUM SERPL-MCNC: 3.7 MMOL/L — SIGNIFICANT CHANGE UP (ref 3.5–5.3)
POTASSIUM SERPL-SCNC: 3.7 MMOL/L — SIGNIFICANT CHANGE UP (ref 3.5–5.3)
RBC # BLD: 4.13 M/UL — SIGNIFICANT CHANGE UP (ref 3.8–5.2)
RBC # FLD: 13.2 % — SIGNIFICANT CHANGE UP (ref 10.3–14.5)
SODIUM SERPL-SCNC: 134 MMOL/L — LOW (ref 135–145)
WBC # BLD: 10.08 K/UL — SIGNIFICANT CHANGE UP (ref 3.8–10.5)
WBC # FLD AUTO: 10.08 K/UL — SIGNIFICANT CHANGE UP (ref 3.8–10.5)

## 2024-05-27 PROCEDURE — 99232 SBSQ HOSP IP/OBS MODERATE 35: CPT

## 2024-05-27 PROCEDURE — 99233 SBSQ HOSP IP/OBS HIGH 50: CPT

## 2024-05-27 RX ORDER — CIPROFLOXACIN LACTATE 400MG/40ML
400 VIAL (ML) INTRAVENOUS EVERY 12 HOURS
Refills: 0 | Status: DISCONTINUED | OUTPATIENT
Start: 2024-05-27 | End: 2024-05-28

## 2024-05-27 RX ORDER — POTASSIUM CHLORIDE 20 MEQ
40 PACKET (EA) ORAL ONCE
Refills: 0 | Status: COMPLETED | OUTPATIENT
Start: 2024-05-27 | End: 2024-05-27

## 2024-05-27 RX ADMIN — Medication 40 MILLIEQUIVALENT(S): at 09:38

## 2024-05-27 RX ADMIN — Medication 325 MILLIGRAM(S): at 17:05

## 2024-05-27 RX ADMIN — Medication 200 MILLIGRAM(S): at 18:37

## 2024-05-27 RX ADMIN — Medication 250 MILLIGRAM(S): at 05:23

## 2024-05-27 RX ADMIN — PIPERACILLIN AND TAZOBACTAM 25 GRAM(S): 4; .5 INJECTION, POWDER, LYOPHILIZED, FOR SOLUTION INTRAVENOUS at 13:41

## 2024-05-27 RX ADMIN — Medication 325 MILLIGRAM(S): at 03:50

## 2024-05-27 RX ADMIN — HEPARIN SODIUM 5000 UNIT(S): 5000 INJECTION INTRAVENOUS; SUBCUTANEOUS at 21:16

## 2024-05-27 RX ADMIN — Medication 325 MILLIGRAM(S): at 02:59

## 2024-05-27 RX ADMIN — PIPERACILLIN AND TAZOBACTAM 25 GRAM(S): 4; .5 INJECTION, POWDER, LYOPHILIZED, FOR SOLUTION INTRAVENOUS at 06:35

## 2024-05-27 RX ADMIN — Medication 325 MILLIGRAM(S): at 07:26

## 2024-05-27 RX ADMIN — Medication 325 MILLIGRAM(S): at 18:00

## 2024-05-27 RX ADMIN — HEPARIN SODIUM 5000 UNIT(S): 5000 INJECTION INTRAVENOUS; SUBCUTANEOUS at 09:37

## 2024-05-27 RX ADMIN — Medication 325 MILLIGRAM(S): at 08:31

## 2024-05-27 RX ADMIN — Medication 1 TABLET(S): at 11:18

## 2024-05-27 RX ADMIN — Medication 250 MILLIGRAM(S): at 17:02

## 2024-05-27 RX ADMIN — ATORVASTATIN CALCIUM 20 MILLIGRAM(S): 80 TABLET, FILM COATED ORAL at 21:16

## 2024-05-27 NOTE — PROGRESS NOTE ADULT - SUBJECTIVE AND OBJECTIVE BOX
Patient is a 79y old  Female who presents with a chief complaint of Infected medial malleolus venous ulcer (26 May 2024 16:19)    S: Patient seen and examined at bedside.  Patient with no complaint.  Denies chest pain, shortness of breath, nausea, vomiting. Reports some pain at wound site.      MEDICATIONS  (STANDING):  atorvastatin 20 milliGRAM(s) Oral at bedtime  heparin   Injectable 5000 Unit(s) SubCutaneous every 12 hours  multivitamin 1 Tablet(s) Oral daily  piperacillin/tazobactam IVPB.. 3.375 Gram(s) IV Intermittent every 8 hours  polyethylene glycol 3350 17 Gram(s) Oral daily  potassium chloride    Tablet ER 40 milliEquivalent(s) Oral once  vancomycin  IVPB 1000 milliGRAM(s) IV Intermittent every 12 hours    MEDICATIONS  (PRN):  acetaminophen     Tablet .. 325 milliGRAM(s) Oral every 4 hours PRN Mild Pain (1 - 3)  ondansetron Injectable 4 milliGRAM(s) IV Push every 6 hours PRN Nausea      Allergies    latex (Rash)  minocycline (Rash)    Intolerances          Vital Signs Last 24 Hrs  T(C): 36.6 (27 May 2024 05:33), Max: 36.6 (27 May 2024 05:33)  T(F): 97.9 (27 May 2024 05:33), Max: 97.9 (27 May 2024 05:33)  HR: 67 (27 May 2024 05:33) (67 - 87)  BP: 115/58 (27 May 2024 05:33) (115/58 - 141/65)  BP(mean): 83 (27 May 2024 05:33) (83 - 93)  RR: 16 (27 May 2024 05:33) (16 - 18)  SpO2: 97% (27 May 2024 05:33) (96% - 98%)    Parameters below as of 27 May 2024 05:33  Patient On (Oxygen Delivery Method): room air      CAPILLARY BLOOD GLUCOSE          05-26 @ 07:01  -  05-27 @ 07:00  --------------------------------------------------------  IN: 1097 mL / OUT: 2700 mL / NET: -1603 mL        Physical Exam:    General: NAD, resting comfortably in bed  Pulmonary: normal resp effort, on room air   Cardiovascular: NS HD stable  Abdominal: soft, ND/NT,   Extremities: WWP, normal strength, no clubbing/cyanosis/edema, bilateral asymptomatic varicose and spider veins, right medial malleolar venous ulcer with fibrinous growth in wound, no crepitus, drainage    Neuro: A/O x 3, CNs II-XII grossly intact, normal sensation, no focal deficits  Pulses: palpable distal pulses    LABS:                        12.3   10.08 )-----------( 363      ( 27 May 2024 05:30 )             36.5     05-27    134<L>  |  100  |  13  ----------------------------<  158<H>  3.7   |  21<L>  |  0.64    Ca    9.7      27 May 2024 05:30  Phos  2.9     05-27  Mg     2.0     05-27        Urinalysis Basic - ( 27 May 2024 05:30 )    Color: x / Appearance: x / SG: x / pH: x  Gluc: 158 mg/dL / Ketone: x  / Bili: x / Urobili: x   Blood: x / Protein: x / Nitrite: x   Leuk Esterase: x / RBC: x / WBC x   Sq Epi: x / Non Sq Epi: x / Bacteria: x          ---------------------------------------------------------------------------  PLEASE CHECK WHEN PRESENT:     [  ] Heart Failure     [  ] Acute     [  ] Acute on Chronic     [  ] Chronic  -------------------------------------------------------------------     [  ]Diastolic [HFpEF]     [  ]Systolic [HFrEF]     [  ]Combined [HFpEF & HFrEF]     [  ] afib     [  ] hypertensive heart disease     [  ]Other:  -------------------------------------------------------------------  [ ] Respiratory failure  [ ] Acute cor pulmonale  [ ] Asthma/COPD Exacerbation  [ ] Pleural effusion  [ ] Aspiration pneumonia  -------------------------------------------------------------------  [  ]GARRETT     [  ]ATN     [  ]Reneal Medullary Necrosis     [  ]Renal Cortical Necrosis     [  ]Other Pathological Lesions:    [  ]CKD 1  [  ]CKD 2  [  ]CKD 3  [  ]CKD 4  [  ]CKD 5  [  ]Other  -------------------------------------------------------------------  [  ]Diabetes  [  ] Diabetic PVD Ulcer  [  ] Neuropathic ulcer to DM  [  ] Diabetes with Nephropathy  [  ] Osteomyelitis due to diabetes  [ ] Hyperglycemia  [ ] Hypoglycemia  --------------------------------------------------------------------  [  ]Malnutrition: See Nutrition Note  [  ]Cachexia  [  ]Other:   [  ]Supplement Ordered:  [  ]Morbid Obesity (BMI >=40]  ---------------------------------------------------------------------  [ ] Sepsis/severe sepsis/septic shock  [ ] UTI  [ ] Pneumonia  -----------------------------------------------------------------------  [ ] Acidosis/alkalosis  [ ] Fluid overload  [X ] Hypokalemia  [ ] Hyperkalemia  [ ] Hypomagnesemia  [ ] Hypophosphatemia  [ ] Hyperphosphatemia  [ ] Hyponatremia  [ ] Hypernatremia  ------------------------------------------------------------------------  [ ] Acute blood loss anemia  [ ] Post op blood loss anemia  [ ] Iron deficiency anemia  [ ] Anemia due to chronic disease  [ ] Hypercoagulable state  [ ] Leukocytosis  ----------------------------------------------------------------------  [ ] Cerebral infarction  [ ] Transient ischemia attack  [ ] Encephalopathy

## 2024-05-27 NOTE — PROGRESS NOTE ADULT - SUBJECTIVE AND OBJECTIVE BOX
INTERVAL EVENTS:    PAST MEDICAL & SURGICAL HISTORY:  Hyperlipemia    Rosacea    Chronic venous insufficiency    Venous ulcer of right lower extremity without varicose veins        MEDICATIONS  (STANDING):  atorvastatin 20 milliGRAM(s) Oral at bedtime  heparin   Injectable 5000 Unit(s) SubCutaneous every 12 hours  multivitamin 1 Tablet(s) Oral daily  piperacillin/tazobactam IVPB.. 3.375 Gram(s) IV Intermittent every 8 hours  polyethylene glycol 3350 17 Gram(s) Oral daily  vancomycin  IVPB 1000 milliGRAM(s) IV Intermittent every 12 hours    MEDICATIONS  (PRN):  acetaminophen     Tablet .. 325 milliGRAM(s) Oral every 4 hours PRN Mild Pain (1 - 3)  ondansetron Injectable 4 milliGRAM(s) IV Push every 6 hours PRN Nausea    T(F): 97.6 (05-27-24 @ 08:31), Max: 97.9 (05-27-24 @ 05:33)  HR: 83 (05-27-24 @ 08:31) (67 - 87)  BP: 123/58 (05-27-24 @ 08:31) (115/58 - 141/65)  BP(mean): 83 (05-27-24 @ 08:31) (83 - 93)  ABP: --  ABP(mean): --  RR: 16 (05-27-24 @ 08:31) (16 - 18)  SpO2: 95% (05-27-24 @ 08:31) (95% - 98%)    I/O Detail 24H    26 May 2024 07:01  -  27 May 2024 07:00  --------------------------------------------------------  IN:    IV PiggyBack: 250 mL    IV PiggyBack: 150 mL    Oral Fluid: 697 mL  Total IN: 1097 mL    OUT:    Voided (mL): 2700 mL  Total OUT: 2700 mL    Total NET: -1603 mL      27 May 2024 07:01  -  27 May 2024 11:08  --------------------------------------------------------  IN:    Oral Fluid: 180 mL  Total IN: 180 mL    OUT:  Total OUT: 0 mL    Total NET: 180 mL          PHYSICAL EXAM:  GEN: NAD  HEENT: EOMI   RESP: CTA b/l  CV: RRR. Normal S1/S2. No m/r/g.  ABD: soft, non-distended  EXT: No edema   NEURO: alert and attentive    LABS:  CBC 05-27-24 @ 05:30                        12.3   10.08 )-----------( 363                   36.5       Hgb trend: 12.3 <-- , 12.4 <-- , 12.3 <-- , 12.2 <--   WBC trend: 10.08 <-- , 8.60 <-- , 7.33 <-- , 10.77 <--       CMP 05-27-24 @ 05:30    134<L>  |  100  |  13  ----------------------------<  158<H>  3.7   |  21<L>  |  0.64    Ca    9.7      05-27-24 @ 05:30  Phos  2.9     05-27  Mg     2.0     05-27        Serum Cr trend: 0.64 <-- , 0.58 <-- , 0.63 <-- , 0.60 <--         Cardiac Markers           STUDIES:

## 2024-05-28 ENCOUNTER — TRANSCRIPTION ENCOUNTER (OUTPATIENT)
Age: 79
End: 2024-05-28

## 2024-05-28 LAB
ANION GAP SERPL CALC-SCNC: 14 MMOL/L — SIGNIFICANT CHANGE UP (ref 5–17)
BUN SERPL-MCNC: 13 MG/DL — SIGNIFICANT CHANGE UP (ref 7–23)
CALCIUM SERPL-MCNC: 9.8 MG/DL — SIGNIFICANT CHANGE UP (ref 8.4–10.5)
CHLORIDE SERPL-SCNC: 99 MMOL/L — SIGNIFICANT CHANGE UP (ref 96–108)
CO2 SERPL-SCNC: 19 MMOL/L — LOW (ref 22–31)
CREAT SERPL-MCNC: 0.48 MG/DL — LOW (ref 0.5–1.3)
EGFR: 97 ML/MIN/1.73M2 — SIGNIFICANT CHANGE UP
GLUCOSE SERPL-MCNC: 117 MG/DL — HIGH (ref 70–99)
HCT VFR BLD CALC: 41.4 % — SIGNIFICANT CHANGE UP (ref 34.5–45)
HGB BLD-MCNC: 13.5 G/DL — SIGNIFICANT CHANGE UP (ref 11.5–15.5)
MAGNESIUM SERPL-MCNC: 2.1 MG/DL — SIGNIFICANT CHANGE UP (ref 1.6–2.6)
MCHC RBC-ENTMCNC: 29.9 PG — SIGNIFICANT CHANGE UP (ref 27–34)
MCHC RBC-ENTMCNC: 32.6 GM/DL — SIGNIFICANT CHANGE UP (ref 32–36)
MCV RBC AUTO: 91.8 FL — SIGNIFICANT CHANGE UP (ref 80–100)
NRBC # BLD: 0 /100 WBCS — SIGNIFICANT CHANGE UP (ref 0–0)
PHOSPHATE SERPL-MCNC: 3.3 MG/DL — SIGNIFICANT CHANGE UP (ref 2.5–4.5)
PLATELET # BLD AUTO: 278 K/UL — SIGNIFICANT CHANGE UP (ref 150–400)
POTASSIUM SERPL-MCNC: 4.1 MMOL/L — SIGNIFICANT CHANGE UP (ref 3.5–5.3)
POTASSIUM SERPL-MCNC: SIGNIFICANT CHANGE UP (ref 3.5–5.3)
POTASSIUM SERPL-SCNC: 4.1 MMOL/L — SIGNIFICANT CHANGE UP (ref 3.5–5.3)
POTASSIUM SERPL-SCNC: SIGNIFICANT CHANGE UP (ref 3.5–5.3)
RBC # BLD: 4.51 M/UL — SIGNIFICANT CHANGE UP (ref 3.8–5.2)
RBC # FLD: 13.4 % — SIGNIFICANT CHANGE UP (ref 10.3–14.5)
SODIUM SERPL-SCNC: 132 MMOL/L — LOW (ref 135–145)
WBC # BLD: 10.6 K/UL — HIGH (ref 3.8–10.5)
WBC # FLD AUTO: 10.6 K/UL — HIGH (ref 3.8–10.5)

## 2024-05-28 PROCEDURE — 99232 SBSQ HOSP IP/OBS MODERATE 35: CPT

## 2024-05-28 RX ORDER — PIPERACILLIN AND TAZOBACTAM 4; .5 G/20ML; G/20ML
3.38 INJECTION, POWDER, LYOPHILIZED, FOR SOLUTION INTRAVENOUS EVERY 8 HOURS
Refills: 0 | Status: DISCONTINUED | OUTPATIENT
Start: 2024-05-28 | End: 2024-05-30

## 2024-05-28 RX ORDER — VANCOMYCIN HCL 1 G
1000 VIAL (EA) INTRAVENOUS EVERY 24 HOURS
Refills: 0 | Status: COMPLETED | OUTPATIENT
Start: 2024-05-28 | End: 2024-05-29

## 2024-05-28 RX ADMIN — HEPARIN SODIUM 5000 UNIT(S): 5000 INJECTION INTRAVENOUS; SUBCUTANEOUS at 21:49

## 2024-05-28 RX ADMIN — ATORVASTATIN CALCIUM 20 MILLIGRAM(S): 80 TABLET, FILM COATED ORAL at 21:49

## 2024-05-28 RX ADMIN — Medication 1 TABLET(S): at 10:37

## 2024-05-28 RX ADMIN — HEPARIN SODIUM 5000 UNIT(S): 5000 INJECTION INTRAVENOUS; SUBCUTANEOUS at 10:36

## 2024-05-28 RX ADMIN — PIPERACILLIN AND TAZOBACTAM 25 GRAM(S): 4; .5 INJECTION, POWDER, LYOPHILIZED, FOR SOLUTION INTRAVENOUS at 18:57

## 2024-05-28 RX ADMIN — PIPERACILLIN AND TAZOBACTAM 25 GRAM(S): 4; .5 INJECTION, POWDER, LYOPHILIZED, FOR SOLUTION INTRAVENOUS at 11:58

## 2024-05-28 RX ADMIN — Medication 250 MILLIGRAM(S): at 16:30

## 2024-05-28 RX ADMIN — Medication 200 MILLIGRAM(S): at 05:46

## 2024-05-28 RX ADMIN — Medication 325 MILLIGRAM(S): at 23:56

## 2024-05-28 NOTE — CHART NOTE - NSCHARTNOTEFT_GEN_A_CORE
Patient will require a rollator at home due to their diagnosis of right lower extremity cellulitis to help complete MRADLs.

## 2024-05-28 NOTE — PHYSICAL THERAPY INITIAL EVALUATION ADULT - PHYSICAL ASSIST/NONPHYSICAL ASSIST: STAND/SIT, REHAB EVAL
Patient name and  verified. Patient informed of gender. No further questions. verbal cues/1 person assist

## 2024-05-28 NOTE — PHYSICAL THERAPY INITIAL EVALUATION ADULT - PERTINENT HX OF CURRENT PROBLEM, REHAB EVAL
78 y/o F with PMHx of HLD, hypercalcemia, Schamberg's, Rosacea, R GSV reflux, bilateral asymptomatic varicose and spider veins, presenting with infected right medial malleolar venous ulcer, patient seen earlier in clinic today and sent to ED for admission and antibiotics. As per patient the wound has been present for over 6 months, she was been compliant with dressings (weekly unaboot) and recently prescribed Keflex.     Seen in the ED, patient afebrile, normotensive, right medial malleolar ulcer measuring about 3 x4 cm, with fibrinous background, non tender, foul smelling (culture sent), bilateral hyperpigmentation on lower extremities.

## 2024-05-28 NOTE — DISCHARGE NOTE PROVIDER - CARE PROVIDER_API CALL
Gee Saldviar  Vascular Surgery  [address]  [place] [address]  Phone: [phone]  Fax: [phone]  Follow Up Time: 1 week   Gee Saldivar  Vascular Surgery  [address]  [place] [address]  Phone: [phone]  Fax: [phone]  Scheduled Appointment: 06/12/2024 10:15 AM

## 2024-05-28 NOTE — DISCHARGE NOTE PROVIDER - HOSPITAL COURSE
78 y/o F with PMHx of HLD, hypercalcemia, Schamberg's, Rosacea, R GSV reflux, bilateral asymptomatic varicose and spider veins, presenting with infected right medial malleolar venous ulcer, patient seen earlier in clinic today and sent to ED for admission and antibiotics. As per patient the wound has been present for over 6 months, she was been compliant with dressings (weekly unnaboot) and recently prescribed Keflex. Blood cultures were NGTD and wound cultures grew enterobacter and pseudomonas. Patient originally treated with vanc/zosyn with nicolas's solution dressing changes three times daily. Antibiotics were switched to ciprofloxacin on 5/27. On 5/28, patient's antibiotics changed to vanc/zosyn again with twice daily wet to dry dressings. 80 y/o F with PMHx of HLD, hypercalcemia, Schamberg's, Rosacea, R GSV reflux, bilateral asymptomatic varicose and spider veins, presenting with infected right medial malleolar venous ulcer, patient seen earlier in clinic today and sent to ED for admission and antibiotics. As per patient the wound has been present for over 6 months, she was been compliant with dressings (weekly unnaboot) and recently prescribed Keflex. Blood cultures were NGTD and wound cultures grew enterobacter and pseudomonas. Patient originally treated with vanc/zosyn with nicolas's solution dressing changes three times daily. Antibiotics were switched to ciprofloxacin on 5/27. On 5/28, patient's antibiotics changed back to vanc/zosyn again due to increase in WBC and wound treated with twice daily wet to dry dressings. 80 y/o F with PMHx of HLD, hypercalcemia, Schamberg's, Rosacea, R GSV reflux, bilateral asymptomatic varicose and spider veins, presenting with infected right medial malleolar venous ulcer, patient seen in clinic and sent to ED for admission and antibiotics. As per patient the wound has been present for over 6 months, she was been compliant with dressings (weekly unna boot) and recently prescribed Keflex. Blood cultures were NGTD and wound cultures grew enterobacter and pseudomonas. Patient originally treated with vanc/zosyn with nicolas's solution dressing changes three times daily. Antibiotics were switched to ciprofloxacin on 5/27. On 5/28, patient's antibiotics changed back to vanc/zosyn again due to increase in WBC and wound treated with twice daily wet to dry dressings. Patient completed 10 days of IV antibiotics with markedly improved ulcer appearance and resolution of infection. On day of discharge patient is feeling well, all the VS and blood work is within normal limits, the pain is well controlled on oral pain medication and she is ambulating well  - patient is stable and ready for discharge today with home PT, and visiting nurse services.

## 2024-05-28 NOTE — DISCHARGE NOTE PROVIDER - PROVIDER TOKENS
PROVIDER:[TOKEN:[[id number]],FOLLOWUP:[1 week]] PROVIDER:[TOKEN:[[id number]],SCHEDULEDAPPT:[06/12/2024],SCHEDULEDAPPTTIME:[10:15 AM]]

## 2024-05-28 NOTE — PROGRESS NOTE ADULT - SUBJECTIVE AND OBJECTIVE BOX
SUBJECTIVE:  Patient was seen and examined at bedside. Reports feeling improved, denies foot pain, denies other complaints, no events reported. Telemetry reviewed.    Review of systems: No fever, chills, dizziness, HA, Changes in vision, CP, dyspnea, nausea or vomiting, dysuria, changes in bowel movements, LE edema. Rest of 12 point Review of systems negative unless otherwise documented elsewhere in note.     Diet, Regular:   DASH/TLC Sodium & Cholesterol Restricted (DASH) (05-24-24 @ 15:17) [Active]      MEDICATIONS:  MEDICATIONS  (STANDING):  atorvastatin 20 milliGRAM(s) Oral at bedtime  ciprofloxacin   IVPB 400 milliGRAM(s) IV Intermittent every 12 hours  heparin   Injectable 5000 Unit(s) SubCutaneous every 12 hours  multivitamin 1 Tablet(s) Oral daily  polyethylene glycol 3350 17 Gram(s) Oral daily    MEDICATIONS  (PRN):  acetaminophen     Tablet .. 325 milliGRAM(s) Oral every 4 hours PRN Mild Pain (1 - 3)  ondansetron Injectable 4 milliGRAM(s) IV Push every 6 hours PRN Nausea      Allergies    latex (Rash)  minocycline (Rash)    Intolerances        OBJECTIVE:  Vital Signs Last 24 Hrs  T(C): 36.4 (28 May 2024 05:44), Max: 36.7 (27 May 2024 20:19)  T(F): 97.5 (28 May 2024 05:44), Max: 98 (27 May 2024 20:19)  HR: 90 (28 May 2024 08:22) (73 - 90)  BP: 123/58 (28 May 2024 08:22) (116/56 - 138/63)  BP(mean): 95 (28 May 2024 05:44) (80 - 95)  RR: 18 (28 May 2024 08:22) (16 - 18)  SpO2: 98% (28 May 2024 08:22) (95% - 99%)    Parameters below as of 28 May 2024 08:22  Patient On (Oxygen Delivery Method): room air      I&O's Summary    27 May 2024 07:01  -  28 May 2024 07:00  --------------------------------------------------------  IN: 660 mL / OUT: 2901 mL / NET: -2241 mL    28 May 2024 07:01  -  28 May 2024 10:48  --------------------------------------------------------  IN: 240 mL / OUT: 1 mL / NET: 239 mL        PHYSICAL EXAM:  General: AO, NAD, lying in bed, speaking in full sentences, no labored breathing on RA  HEENT: AT/NC, no facial asymmetry   Lungs: no labored breathing   Heart: regular  Abdomen: soft, no tenderness  Extremities:  right foot in dressing, no edema, no tenderness, no focal deficit    LABS:                        13.5   10.60 )-----------( 278      ( 28 May 2024 05:30 )             41.4     05-28    x   |  x   |  x   ----------------------------<  x   4.1   |  x   |  x     Ca    9.8      28 May 2024 05:30  Phos  3.3     05-28  Mg     2.1     05-28          CAPILLARY BLOOD GLUCOSE        Urinalysis Basic - ( 28 May 2024 05:30 )    Color: x / Appearance: x / SG: x / pH: x  Gluc: 117 mg/dL / Ketone: x  / Bili: x / Urobili: x   Blood: x / Protein: x / Nitrite: x   Leuk Esterase: x / RBC: x / WBC x   Sq Epi: x / Non Sq Epi: x / Bacteria: x        MICRODATA:      RADIOLOGY/OTHER STUDIES:

## 2024-05-28 NOTE — DISCHARGE NOTE PROVIDER - NSDCCPCAREPLAN_GEN_ALL_CORE_FT
PRINCIPAL DISCHARGE DIAGNOSIS  Diagnosis: Cellulitis of right leg  Assessment and Plan of Treatment:       SECONDARY DISCHARGE DIAGNOSES  Diagnosis: Venous stasis ulcer without varicose veins  Assessment and Plan of Treatment:     Diagnosis: Chronic venous insufficiency  Assessment and Plan of Treatment:     Diagnosis: Rosacea  Assessment and Plan of Treatment:     Diagnosis: Hyperlipemia  Assessment and Plan of Treatment:     Diagnosis: Hypercalcemia  Assessment and Plan of Treatment:     Diagnosis: Hyponatremia  Assessment and Plan of Treatment:

## 2024-05-28 NOTE — PHYSICAL THERAPY INITIAL EVALUATION ADULT - ADDITIONAL COMMENTS
Pt lives with brother in an apt building, no TEN, elevator. Pt states she has been relying on her brother to assist her physically recently.

## 2024-05-28 NOTE — DISCHARGE NOTE PROVIDER - NSDCFUSCHEDAPPT_GEN_ALL_CORE_FT
Willie Ordoñez  [facility]  [facility] [address]  Scheduled Appointment: 06/13/2024

## 2024-05-28 NOTE — DISCHARGE NOTE PROVIDER - NSDCMRMEDTOKEN_GEN_ALL_CORE_FT
Lipitor 20 mg oral tablet: 1 tab(s) orally   Lipitor 20 mg oral tablet: 1 tab(s) orally  Rollator: Patient will require a rollator at home due to their dx of RLE cellulitis to help complete MRADLs   Lipitor 20 mg oral tablet: 1 tab(s) orally once a day  Rollator: Patient will require a rollator at home due to their dx of RLE cellulitis to help complete MRADLs

## 2024-05-28 NOTE — DISCHARGE NOTE PROVIDER - NSDCFUADDINST_GEN_ALL_CORE_FT
FOLLOW UP: Dr. Saldivar in 1 week. Your appointment has been made for _______. Call the office at  with any questions.    WOUND CARE: For the RLE to be done twice daily- wet to dry with normal saline, wrap with kerlix and cover with ACE. You may shower; soap and water over incision sites. Do not scrub. Pat dry when done.     ACTIVITY: When laying, keep legs elevated above the heart. Ambulate as tolerated, but no heavy lifting (>10lbs) or strenuous exercise.    DIET: You may resume regular diet. Call the office if you experience increasing pain, redness, swelling or drainage from incision sites/wounds, or temperature >101.4F. NEW     MEDICATIONS:    ADDITIONAL FOLLOW UP AFTER DISCHARGE:    DISCHARGE DESTINATION:     Discharge Education provided:  FOLLOW UP: Dr. Saldivar in 1 week. Your appointment has been made for _______. Call the office at  with any questions.    WOUND CARE: You may shower; soap and water over incision sites. Do not scrub. Pat dry when done.   RIGHT LOWER EXTREMITY WOUND: Clean wound with hydrogen peroxide. Then dampen gauze with saline solution. Place on or into wound. Cover with dry gauze and Kerlix wrap daily.    ACTIVITY: When laying, keep legs elevated above the heart. Ambulate as tolerated, but no heavy lifting (>10lbs) or strenuous exercise.    DIET: You may resume regular diet.     Call the office if you experience increasing pain, redness, swelling or drainage from incision sites/wounds, or temperature >101.4F.     NEW MEDICATIONS:    ADDITIONAL FOLLOW UP AFTER DISCHARGE: follow up with your PCP    DISCHARGE DESTINATION: home    Discharge Education provided: yes FOLLOW UP: Dr. Saldivar in 1 week. Your appointment has been made for _______. Call the office at  with any questions.    WOUND CARE: You may shower; soap and water over incision sites. Do not scrub. Pat dry when done.   RIGHT LOWER EXTREMITY WOUND: Clean wound with hydrogen peroxide. Then dampen gauze with saline solution. Place on or into wound. Cover with dry gauze and Kerlix wrap daily.    ACTIVITY: When laying, keep legs elevated above the heart. Ambulate as tolerated, but no heavy lifting (>10lbs) or strenuous exercise.    DIET: You may resume regular diet.     Call the office if you experience increasing pain, redness, swelling or drainage from incision sites/wounds, or temperature >101.4F.     NEW MEDICATIONS: none    ADDITIONAL FOLLOW UP AFTER DISCHARGE: follow up with your PCP    DISCHARGE DESTINATION: home    Discharge Education provided: yes FOLLOW UP: Dr. Saldivar in 1 week. Your appointment has been made for 6/12/24 at 10:15am. Call the office at  with any questions.    WOUND CARE: You may shower; soap and water over incision sites. Do not scrub. Pat dry when done.   RIGHT LOWER EXTREMITY WOUND: Clean wound with hydrogen peroxide. Then dampen gauze with saline solution. Place on or into wound. Cover with dry gauze and Kerlix wrap daily.    ACTIVITY: When laying, keep legs elevated above the heart. Ambulate as tolerated, but no heavy lifting (>10lbs) or strenuous exercise.    DIET: You may resume regular diet.     Call the office if you experience increasing pain, redness, swelling or drainage from incision sites/wounds, or temperature >101.4F.     NEW MEDICATIONS: none    ADDITIONAL FOLLOW UP AFTER DISCHARGE: follow up with your PCP    DISCHARGE DESTINATION: home    Discharge Education provided: yes

## 2024-05-28 NOTE — PROGRESS NOTE ADULT - SUBJECTIVE AND OBJECTIVE BOX
O/N: MALLORIE, VSS, PM dressing chnaged                 ---------------------------------------------------------------------------  PLEASE CHECK WHEN PRESENT:     [  ] Heart Failure     [  ] Acute     [  ] Acute on Chronic     [  ] Chronic  -------------------------------------------------------------------     [  ]Diastolic [HFpEF]     [  ]Systolic [HFrEF]     [  ]Combined [HFpEF & HFrEF]     [  ] afib     [  ] hypertensive heart disease     [  ]Other:  -------------------------------------------------------------------  [ ] Respiratory failure  [ ] Acute cor pulmonale  [ ] Asthma/COPD Exacerbation  [ ] Pleural effusion  [ ] Aspiration pneumonia  -------------------------------------------------------------------  [  ]GARRETT     [  ]ATN     [  ]Reneal Medullary Necrosis     [  ]Renal Cortical Necrosis     [  ]Other Pathological Lesions:    [  ]CKD 1  [  ]CKD 2  [  ]CKD 3  [  ]CKD 4  [  ]CKD 5  [  ]Other  -------------------------------------------------------------------  [  ]Diabetes  [  ] Diabetic PVD Ulcer  [  ] Neuropathic ulcer to DM  [  ] Diabetes with Nephropathy  [  ] Osteomyelitis due to diabetes  [ ] Hyperglycemia  [ ] Hypoglycemia  --------------------------------------------------------------------  [  ]Malnutrition: See Nutrition Note  [  ]Cachexia  [  ]Other:   [  ]Supplement Ordered:  [  ]Morbid Obesity (BMI >=40]  ---------------------------------------------------------------------  [ ] Sepsis/severe sepsis/septic shock  [ ] UTI  [ ] Pneumonia  -----------------------------------------------------------------------  [ ] Acidosis/alkalosis  [ ] Fluid overload  [X ] Hypokalemia  [ ] Hyperkalemia  [ ] Hypomagnesemia  [ ] Hypophosphatemia  [ ] Hyperphosphatemia  [ ] Hyponatremia  [ ] Hypernatremia  ------------------------------------------------------------------------  [ ] Acute blood loss anemia  [ ] Post op blood loss anemia  [ ] Iron deficiency anemia  [ ] Anemia due to chronic disease  [ ] Hypercoagulable state  [ ] Leukocytosis  ----------------------------------------------------------------------  [ ] Cerebral infarction  [ ] Transient ischemia attack  [ ] Encephalopathy          Assessment and Plan:   · Assessment	  78 y/o F with PMHx of HLD, hypercalcemia, Schamberg's, Rosacea, R GSV reflux, known to the vascular surgery service, being admitted for infected right medial malleolar venous ulcer, In the ED, patient afebrile, normotensive, right medial malleolar ulcer measuring about 3 x4 cm, with fibrinous background, non tender, foul smelling (culture sent), bilateral hyperpigmentation on lower extremities.    BID dressing changes with wet to dry dressings  Wound culture sent on 5/24: Enterobacter and Pseudomonas  Zosyn and Vancomycin IV will follow vanc through at 6am on 5/28  Continue Home Lipitor   AM labs      O/N: MALLORIE, VSS, PM dressing chnaged     Subjective: Patient seen and examined at bedside, resting comfortably. Denies acute complaints this morning.     ROS:   Denies Headache, blurred vision, Chest Pain, SOB, Abdominal pain, nausea or vomiting     Social   ciprofloxacin   IVPB 400  ciprofloxacin   IVPB 400  heparin   Injectable 5000      Allergies    latex (Rash)  minocycline (Rash)    Intolerances        Vital Signs Last 24 Hrs  T(C): 36.4 (28 May 2024 05:44), Max: 36.7 (27 May 2024 20:19)  T(F): 97.5 (28 May 2024 05:44), Max: 98 (27 May 2024 20:19)  HR: 79 (28 May 2024 05:44) (73 - 86)  BP: 133/70 (28 May 2024 05:44) (116/56 - 138/63)  BP(mean): 95 (28 May 2024 05:44) (80 - 95)  RR: 17 (28 May 2024 05:44) (16 - 17)  SpO2: 98% (28 May 2024 05:44) (95% - 99%)    Parameters below as of 28 May 2024 05:44  Patient On (Oxygen Delivery Method): room air      I&O's Summary    27 May 2024 07:01  -  28 May 2024 07:00  --------------------------------------------------------  IN: 660 mL / OUT: 2901 mL / NET: -2241 mL        Physical Exam:  General: NAD, resting comfortably  Pulmonary: On room air, nonlabored breathing, no respiratory distress  Cardiovascular: NSR  Abdominal: Soft  Extremities: right medial malleolar venous ulcer with fibrinous growth in wound, no crepitus, drainage. WWP, normal strength, no clubbing/cyanosis/edema, bilateral asymptomatic varicose and spider veins  Pulses: palpable distal pulses    LABS:                        12.3   10.08 )-----------( 363      ( 27 May 2024 05:30 )             36.5     05-27    134<L>  |  100  |  13  ----------------------------<  158<H>  3.7   |  21<L>  |  0.64    Ca    9.7      27 May 2024 05:30  Phos  2.9     05-27  Mg     2.0     05-27    ---------------------------------------------------------------------------  PLEASE CHECK WHEN PRESENT:     [  ] Heart Failure     [  ] Acute     [  ] Acute on Chronic     [  ] Chronic  -------------------------------------------------------------------     [  ]Diastolic [HFpEF]     [  ]Systolic [HFrEF]     [  ]Combined [HFpEF & HFrEF]     [  ] afib     [  ] hypertensive heart disease     [  ]Other:  -------------------------------------------------------------------  [ ] Respiratory failure  [ ] Acute cor pulmonale  [ ] Asthma/COPD Exacerbation  [ ] Pleural effusion  [ ] Aspiration pneumonia  -------------------------------------------------------------------  [  ]GARRETT     [  ]ATN     [  ]Reneal Medullary Necrosis     [  ]Renal Cortical Necrosis     [  ]Other Pathological Lesions:    [  ]CKD 1  [  ]CKD 2  [  ]CKD 3  [  ]CKD 4  [  ]CKD 5  [  ]Other  -------------------------------------------------------------------  [  ]Diabetes  [  ] Diabetic PVD Ulcer  [  ] Neuropathic ulcer to DM  [  ] Diabetes with Nephropathy  [  ] Osteomyelitis due to diabetes  [ ] Hyperglycemia  [ ] Hypoglycemia  --------------------------------------------------------------------  [  ]Malnutrition: See Nutrition Note  [  ]Cachexia  [  ]Other:   [  ]Supplement Ordered:  [  ]Morbid Obesity (BMI >=40]  ---------------------------------------------------------------------  [ ] Sepsis/severe sepsis/septic shock  [ ] UTI  [ ] Pneumonia  -----------------------------------------------------------------------  [ ] Acidosis/alkalosis  [ ] Fluid overload  [X ] Hypokalemia  [ ] Hyperkalemia  [ ] Hypomagnesemia  [ ] Hypophosphatemia  [ ] Hyperphosphatemia  [ ] Hyponatremia  [ ] Hypernatremia  ------------------------------------------------------------------------  [ ] Acute blood loss anemia  [ ] Post op blood loss anemia  [ ] Iron deficiency anemia  [ ] Anemia due to chronic disease  [ ] Hypercoagulable state  [ ] Leukocytosis  ----------------------------------------------------------------------  [ ] Cerebral infarction  [ ] Transient ischemia attack  [ ] Encephalopathy      Assessment and Plan:   	  80 y/o F with PMHx of HLD, hypercalcemia, Schamberg's, Rosacea, R GSV reflux, known to the vascular surgery service, being admitted for infected right medial malleolar venous ulcer, In the ED, patient afebrile, normotensive, right medial malleolar ulcer measuring about 3 x4 cm, with fibrinous background, non tender, foul smelling (culture sent), bilateral hyperpigmentation on lower extremities.    Vascular:   -BID dressing changes with wet to dry dressings  -Wound culture sent on 5/24: Enterobacter and Pseudomonas  -Blood culture: NGTD  -dc Zosyn and Vancomycin  -c/w Ciprofloxacin 400mg q12  -Continue Home Lipitor   -pain control    HLD:  -c/w lipitor    Diet: DASH  Activity: ambulate as tolerated  DVT PPX: SQH  Dispo: pending improvement

## 2024-05-29 LAB
ANION GAP SERPL CALC-SCNC: 11 MMOL/L — SIGNIFICANT CHANGE UP (ref 5–17)
BUN SERPL-MCNC: 13 MG/DL — SIGNIFICANT CHANGE UP (ref 7–23)
CALCIUM SERPL-MCNC: 10.1 MG/DL — SIGNIFICANT CHANGE UP (ref 8.4–10.5)
CHLORIDE SERPL-SCNC: 100 MMOL/L — SIGNIFICANT CHANGE UP (ref 96–108)
CO2 SERPL-SCNC: 25 MMOL/L — SIGNIFICANT CHANGE UP (ref 22–31)
CREAT SERPL-MCNC: 0.59 MG/DL — SIGNIFICANT CHANGE UP (ref 0.5–1.3)
CULTURE RESULTS: SIGNIFICANT CHANGE UP
CULTURE RESULTS: SIGNIFICANT CHANGE UP
EGFR: 92 ML/MIN/1.73M2 — SIGNIFICANT CHANGE UP
GLUCOSE SERPL-MCNC: 84 MG/DL — SIGNIFICANT CHANGE UP (ref 70–99)
HCT VFR BLD CALC: 40.3 % — SIGNIFICANT CHANGE UP (ref 34.5–45)
HGB BLD-MCNC: 13.3 G/DL — SIGNIFICANT CHANGE UP (ref 11.5–15.5)
MAGNESIUM SERPL-MCNC: 2.1 MG/DL — SIGNIFICANT CHANGE UP (ref 1.6–2.6)
MCHC RBC-ENTMCNC: 29.6 PG — SIGNIFICANT CHANGE UP (ref 27–34)
MCHC RBC-ENTMCNC: 33 GM/DL — SIGNIFICANT CHANGE UP (ref 32–36)
MCV RBC AUTO: 89.6 FL — SIGNIFICANT CHANGE UP (ref 80–100)
NRBC # BLD: 0 /100 WBCS — SIGNIFICANT CHANGE UP (ref 0–0)
PHOSPHATE SERPL-MCNC: 3.2 MG/DL — SIGNIFICANT CHANGE UP (ref 2.5–4.5)
PLATELET # BLD AUTO: 394 K/UL — SIGNIFICANT CHANGE UP (ref 150–400)
POTASSIUM SERPL-MCNC: 4 MMOL/L — SIGNIFICANT CHANGE UP (ref 3.5–5.3)
POTASSIUM SERPL-SCNC: 4 MMOL/L — SIGNIFICANT CHANGE UP (ref 3.5–5.3)
RBC # BLD: 4.5 M/UL — SIGNIFICANT CHANGE UP (ref 3.8–5.2)
RBC # FLD: 13.4 % — SIGNIFICANT CHANGE UP (ref 10.3–14.5)
SODIUM SERPL-SCNC: 136 MMOL/L — SIGNIFICANT CHANGE UP (ref 135–145)
SPECIMEN SOURCE: SIGNIFICANT CHANGE UP
SPECIMEN SOURCE: SIGNIFICANT CHANGE UP
WBC # BLD: 10.03 K/UL — SIGNIFICANT CHANGE UP (ref 3.8–10.5)
WBC # FLD AUTO: 10.03 K/UL — SIGNIFICANT CHANGE UP (ref 3.8–10.5)

## 2024-05-29 PROCEDURE — 99232 SBSQ HOSP IP/OBS MODERATE 35: CPT

## 2024-05-29 RX ORDER — ASCORBIC ACID 60 MG
500 TABLET,CHEWABLE ORAL DAILY
Refills: 0 | Status: DISCONTINUED | OUTPATIENT
Start: 2024-05-29 | End: 2024-06-03

## 2024-05-29 RX ADMIN — PIPERACILLIN AND TAZOBACTAM 25 GRAM(S): 4; .5 INJECTION, POWDER, LYOPHILIZED, FOR SOLUTION INTRAVENOUS at 19:13

## 2024-05-29 RX ADMIN — HEPARIN SODIUM 5000 UNIT(S): 5000 INJECTION INTRAVENOUS; SUBCUTANEOUS at 11:10

## 2024-05-29 RX ADMIN — Medication 250 MILLIGRAM(S): at 16:38

## 2024-05-29 RX ADMIN — Medication 500 MILLIGRAM(S): at 17:07

## 2024-05-29 RX ADMIN — Medication 325 MILLIGRAM(S): at 17:09

## 2024-05-29 RX ADMIN — HEPARIN SODIUM 5000 UNIT(S): 5000 INJECTION INTRAVENOUS; SUBCUTANEOUS at 21:42

## 2024-05-29 RX ADMIN — Medication 325 MILLIGRAM(S): at 18:00

## 2024-05-29 RX ADMIN — ATORVASTATIN CALCIUM 20 MILLIGRAM(S): 80 TABLET, FILM COATED ORAL at 21:42

## 2024-05-29 RX ADMIN — Medication 325 MILLIGRAM(S): at 00:39

## 2024-05-29 RX ADMIN — PIPERACILLIN AND TAZOBACTAM 25 GRAM(S): 4; .5 INJECTION, POWDER, LYOPHILIZED, FOR SOLUTION INTRAVENOUS at 04:12

## 2024-05-29 RX ADMIN — PIPERACILLIN AND TAZOBACTAM 25 GRAM(S): 4; .5 INJECTION, POWDER, LYOPHILIZED, FOR SOLUTION INTRAVENOUS at 11:10

## 2024-05-29 RX ADMIN — Medication 1 TABLET(S): at 11:11

## 2024-05-29 NOTE — DIETITIAN INITIAL EVALUATION ADULT - OTHER INFO
80 y/o F PMHx HLD, hypercalcemia, Schamberg's, Rosacea, R GSV reflux - being admitted for infected right medial malleolar venous ulcer, In the ED, pt afebrile, normotensive, right medial malleolar ulcer measuring about 3 x4 cm, with fibrinous background, non tender, foul smelling (culture sent), bilateral hyperpigmentation on lower extremities. Wound Cx growing pseudomonas and enterobacter.     Pt seen on 5UR this AM. Alert in bed. Pt has been eating and enjoying meals at [facility]. This AM consumed 100% of breakfast (2 oatmeal, Banana, coffee), and dinner last night (fish, potatoes, carrots, fruit). Reports good PO intake PTA; consumes 3 meals/day and does not take PO supplements given good PO intake PTA. No Know Food Allergies. No issues chewing/swallowing. Pt denies wt changes, stable at current wt for some time.  pounds, consistent with admit wt 110 pounds. Pt does appear to have some wasting/loss however may be age related in nature. Unable to Suspect Malnutrition today. Zofran MIRALAX ordered, Denies NVDC, BM+5/27. No edema or pressure ulcers. MVI ordered. Na 132.   Please see below for nutritions recommendations.

## 2024-05-29 NOTE — PROGRESS NOTE ADULT - SUBJECTIVE AND OBJECTIVE BOX
SUBJECTIVE:  Patient was seen and examined at bedside. Reports feeling fine, denies complaints. Denies pain, no other complaints or events reported. Telemetry reviewed.     Review of systems: No fever, chills, dizziness, HA, Changes in vision, CP, dyspnea, nausea or vomiting, dysuria, changes in bowel movements, LE edema. Rest of 12 point Review of systems negative unless otherwise documented elsewhere in note.     Diet, Regular:   DASH/TLC Sodium & Cholesterol Restricted (DASH) (05-24-24 @ 15:17) [Active]      MEDICATIONS:  MEDICATIONS  (STANDING):  ascorbic acid 500 milliGRAM(s) Oral daily  atorvastatin 20 milliGRAM(s) Oral at bedtime  heparin   Injectable 5000 Unit(s) SubCutaneous every 12 hours  multivitamin 1 Tablet(s) Oral daily  piperacillin/tazobactam IVPB.. 3.375 Gram(s) IV Intermittent every 8 hours  polyethylene glycol 3350 17 Gram(s) Oral daily  vancomycin  IVPB 1000 milliGRAM(s) IV Intermittent every 24 hours    MEDICATIONS  (PRN):  acetaminophen     Tablet .. 325 milliGRAM(s) Oral every 4 hours PRN Mild Pain (1 - 3)  ondansetron Injectable 4 milliGRAM(s) IV Push every 6 hours PRN Nausea      Allergies    latex (Rash)  minocycline (Rash)    Intolerances        OBJECTIVE:  Vital Signs Last 24 Hrs  T(C): 36.6 (29 May 2024 11:09), Max: 37.2 (28 May 2024 21:50)  T(F): 97.8 (29 May 2024 11:09), Max: 99 (28 May 2024 21:50)  HR: 86 (29 May 2024 11:09) (76 - 92)  BP: 128/59 (29 May 2024 11:09) (122/61 - 143/69)  BP(mean): 85 (29 May 2024 05:48) (63 - 96)  RR: 18 (29 May 2024 11:09) (17 - 19)  SpO2: 98% (29 May 2024 11:09) (96% - 99%)    Parameters below as of 29 May 2024 11:09  Patient On (Oxygen Delivery Method): room air      I&O's Summary    28 May 2024 07:01  -  29 May 2024 07:00  --------------------------------------------------------  IN: 660 mL / OUT: 3201 mL / NET: -2541 mL    29 May 2024 07:01  -  29 May 2024 12:54  --------------------------------------------------------  IN: 180 mL / OUT: 602 mL / NET: -422 mL        PHYSICAL EXAM:  General: AOx3, NAD, lying in bed, speaking in full sentences, no labored breathing on RA  HEENT: AT/NC, no facial asymmetry   Lungs: no labored breathing   Heart: regular  Abdomen: soft, no tenderness  Extremities:  right foot in dressing, no edema, no tenderness, no focal deficit      LABS:                        13.3   10.03 )-----------( 394      ( 29 May 2024 08:32 )             40.3     05-29    136  |  100  |  13  ----------------------------<  84  4.0   |  25  |  0.59    Ca    10.1      29 May 2024 08:32  Phos  3.2     05-29  Mg     2.1     05-29          CAPILLARY BLOOD GLUCOSE        Urinalysis Basic - ( 29 May 2024 08:32 )    Color: x / Appearance: x / SG: x / pH: x  Gluc: 84 mg/dL / Ketone: x  / Bili: x / Urobili: x   Blood: x / Protein: x / Nitrite: x   Leuk Esterase: x / RBC: x / WBC x   Sq Epi: x / Non Sq Epi: x / Bacteria: x        MICRODATA:      RADIOLOGY/OTHER STUDIES:

## 2024-05-29 NOTE — DIETITIAN INITIAL EVALUATION ADULT - NSICDXPASTMEDICALHX_GEN_ALL_CORE_FT
PAST MEDICAL HISTORY:  Chronic venous insufficiency     Hyperlipemia     Rosacea     Venous ulcer of right lower extremity without varicose veins

## 2024-05-29 NOTE — DIETITIAN INITIAL EVALUATION ADULT - PERTINENT MEDS FT
MEDICATIONS  (STANDING):  atorvastatin 20 milliGRAM(s) Oral at bedtime  heparin   Injectable 5000 Unit(s) SubCutaneous every 12 hours  multivitamin 1 Tablet(s) Oral daily  piperacillin/tazobactam IVPB.. 3.375 Gram(s) IV Intermittent every 8 hours  polyethylene glycol 3350 17 Gram(s) Oral daily  vancomycin  IVPB 1000 milliGRAM(s) IV Intermittent every 24 hours    MEDICATIONS  (PRN):  acetaminophen     Tablet .. 325 milliGRAM(s) Oral every 4 hours PRN Mild Pain (1 - 3)  ondansetron Injectable 4 milliGRAM(s) IV Push every 6 hours PRN Nausea

## 2024-05-29 NOTE — DIETITIAN INITIAL EVALUATION ADULT - PERTINENT LABORATORY DATA
05-29    136  |  100  |  13  ----------------------------<  84  4.0   |  25  |  0.59    Ca    10.1      29 May 2024 08:32  Phos  3.2     05-29  Mg     2.1     05-29

## 2024-05-29 NOTE — DIETITIAN INITIAL EVALUATION ADULT - OTHER CALCULATIONS
pounds +-10%; %IBW=92  Current body wt used for energy calculations as pt falls within % IBW  Adjusted for age, Skin/Healing - fluids per team based on sodium

## 2024-05-29 NOTE — DIETITIAN INITIAL EVALUATION ADULT - NSFNSGIIOFT_GEN_A_CORE
05-28-24 @ 07:01  -  05-29-24 @ 07:00  --------------------------------------------------------  OUT:    Stool (mL): 1 mL  Total OUT: 1 mL    Total NET: -1 mL      05-29-24 @ 07:01  -  05-29-24 @ 11:11  --------------------------------------------------------  OUT:    Stool (mL): 1 mL  Total OUT: 1 mL    Total NET: -1 mL

## 2024-05-29 NOTE — PROGRESS NOTE ADULT - SUBJECTIVE AND OBJECTIVE BOX
O/N: dressing pm changed.               ---------------------------------------------------------------------------  PLEASE CHECK WHEN PRESENT:     [  ] Heart Failure     [  ] Acute     [  ] Acute on Chronic     [  ] Chronic  -------------------------------------------------------------------     [  ]Diastolic [HFpEF]     [  ]Systolic [HFrEF]     [  ]Combined [HFpEF & HFrEF]     [  ] afib     [  ] hypertensive heart disease     [  ]Other:  -------------------------------------------------------------------  [ ] Respiratory failure  [ ] Acute cor pulmonale  [ ] Asthma/COPD Exacerbation  [ ] Pleural effusion  [ ] Aspiration pneumonia  -------------------------------------------------------------------  [  ]GARRETT     [  ]ATN     [  ]Reneal Medullary Necrosis     [  ]Renal Cortical Necrosis     [  ]Other Pathological Lesions:    [  ]CKD 1  [  ]CKD 2  [  ]CKD 3  [  ]CKD 4  [  ]CKD 5  [  ]Other  -------------------------------------------------------------------  [  ]Diabetes  [  ] Diabetic PVD Ulcer  [  ] Neuropathic ulcer to DM  [  ] Diabetes with Nephropathy  [  ] Osteomyelitis due to diabetes  [ ] Hyperglycemia  [ ] Hypoglycemia  --------------------------------------------------------------------  [  ]Malnutrition: See Nutrition Note  [  ]Cachexia  [  ]Other:   [  ]Supplement Ordered:  [  ]Morbid Obesity (BMI >=40]  ---------------------------------------------------------------------  [ ] Sepsis/severe sepsis/septic shock  [ ] UTI  [ ] Pneumonia  -----------------------------------------------------------------------  [ ] Acidosis/alkalosis  [ ] Fluid overload  [X ] Hypokalemia  [ ] Hyperkalemia  [ ] Hypomagnesemia  [ ] Hypophosphatemia  [ ] Hyperphosphatemia  [ ] Hyponatremia  [ ] Hypernatremia  ------------------------------------------------------------------------  [ ] Acute blood loss anemia  [ ] Post op blood loss anemia  [ ] Iron deficiency anemia  [ ] Anemia due to chronic disease  [ ] Hypercoagulable state  [ ] Leukocytosis  ----------------------------------------------------------------------  [ ] Cerebral infarction  [ ] Transient ischemia attack  [ ] Encephalopathy      Assessment and Plan:   	  80 y/o F with PMHx of HLD, hypercalcemia, Schamberg's, Rosacea, R GSV reflux, known to the vascular surgery service, being admitted for infected right medial malleolar venous ulcer, In the ED, patient afebrile, normotensive, right medial malleolar ulcer measuring about 3 x4 cm, with fibrinous background, non tender, foul smelling (culture sent), bilateral hyperpigmentation on lower extremities.    Vascular:   -BID dressing changes with wet to dry dressings  -Wound culture sent on 5/24: Enterobacter and Pseudomonas  -Blood culture: NGTD  -dc Zosyn and Vancomycin  -c/w Ciprofloxacin 400mg q12  -Continue Home Lipitor   -pain control    HLD:  -c/w lipitor    Diet: DASH  Activity: ambulate as tolerated  DVT PPX: SQH  Dispo: pending improvement   O/N: dressing pm changed.       Subjective: patient seen and examined at bedside, patient states overall feeling well.    ROS:   Denies Headache, blurred vision, Chest Pain, SOB, Abdominal pain, nausea or vomiting     Social   piperacillin/tazobactam IVPB.. 3.375  vancomycin  IVPB 1000  heparin   Injectable 5000  piperacillin/tazobactam IVPB.. 3.375  vancomycin  IVPB 1000      Allergies    latex (Rash)  minocycline (Rash)    Intolerances        Vital Signs Last 24 Hrs  T(C): 36.6 (29 May 2024 05:48), Max: 37.2 (28 May 2024 21:50)  T(F): 97.8 (29 May 2024 05:48), Max: 99 (28 May 2024 21:50)  HR: 83 (29 May 2024 05:48) (76 - 92)  BP: 126/59 (29 May 2024 05:48) (122/61 - 143/69)  BP(mean): 85 (29 May 2024 05:48) (63 - 96)  RR: 19 (29 May 2024 05:48) (17 - 19)  SpO2: 98% (29 May 2024 05:48) (94% - 99%)    Parameters below as of 29 May 2024 05:48  Patient On (Oxygen Delivery Method): room air      I&O's Summary    28 May 2024 07:01  -  29 May 2024 07:00  --------------------------------------------------------  IN: 660 mL / OUT: 3201 mL / NET: -2541 mL        PHYSICAL EXAM:  General: NAD, pt resting comfortably in bed  Pulm: No respiratory distress, nonlabored breathing, on room air  CVS: NSR, HDS  Abd: Soft, NT, ND  Extremities: R medial malleolar venous ulcer with fibrinous tissue in wound, no crepitus/foul odor/drainage. b/l varicose and spider veins noted on LE.      LABS:                        13.5   10.60 )-----------( 278      ( 28 May 2024 05:30 )             41.4     05-28    x   |  x   |  x   ----------------------------<  x   4.1   |  x   |  x     Ca    9.8      28 May 2024 05:30  Phos  3.3     05-28  Mg     2.1     05-28        ---------------------------------------------------------------------------  PLEASE CHECK WHEN PRESENT:     [  ] Heart Failure     [  ] Acute     [  ] Acute on Chronic     [  ] Chronic  -------------------------------------------------------------------     [  ]Diastolic [HFpEF]     [  ]Systolic [HFrEF]     [  ]Combined [HFpEF & HFrEF]     [  ] afib     [  ] hypertensive heart disease     [  ]Other:  -------------------------------------------------------------------  [ ] Respiratory failure  [ ] Acute cor pulmonale  [ ] Asthma/COPD Exacerbation  [ ] Pleural effusion  [ ] Aspiration pneumonia  -------------------------------------------------------------------  [  ]GARRETT     [  ]ATN     [  ]Reneal Medullary Necrosis     [  ]Renal Cortical Necrosis     [  ]Other Pathological Lesions:    [  ]CKD 1  [  ]CKD 2  [  ]CKD 3  [  ]CKD 4  [  ]CKD 5  [  ]Other  -------------------------------------------------------------------  [  ]Diabetes  [  ] Diabetic PVD Ulcer  [  ] Neuropathic ulcer to DM  [  ] Diabetes with Nephropathy  [  ] Osteomyelitis due to diabetes  [ ] Hyperglycemia  [ ] Hypoglycemia  --------------------------------------------------------------------  [  ]Malnutrition: See Nutrition Note  [  ]Cachexia  [  ]Other:   [  ]Supplement Ordered:  [  ]Morbid Obesity (BMI >=40]  ---------------------------------------------------------------------  [ ] Sepsis/severe sepsis/septic shock  [ ] UTI  [ ] Pneumonia  -----------------------------------------------------------------------  [ ] Acidosis/alkalosis  [ ] Fluid overload  [X ] Hypokalemia  [ ] Hyperkalemia  [ ] Hypomagnesemia  [ ] Hypophosphatemia  [ ] Hyperphosphatemia  [ ] Hyponatremia  [ ] Hypernatremia  ------------------------------------------------------------------------  [ ] Acute blood loss anemia  [ ] Post op blood loss anemia  [ ] Iron deficiency anemia  [ ] Anemia due to chronic disease  [ ] Hypercoagulable state  [ ] Leukocytosis  ----------------------------------------------------------------------  [ ] Cerebral infarction  [ ] Transient ischemia attack  [ ] Encephalopathy      A/P: 80 y/o F with PMHx of HLD, hypercalcemia, Schamberg's, Rosacea, R GSV reflux, known to the vascular surgery service, being admitted for infected right medial malleolar venous ulcer, In the ED, patient afebrile, normotensive, right medial malleolar ulcer measuring about 3 x4 cm, with fibrinous background, non tender, foul smelling (culture sent), bilateral hyperpigmentation on lower extremities.    Vascular:   -BID dressing changes with wet to dry dressings  -Wound cx 5/24: Enterobacter and Pseudomonas  -Blood cx: NGTD  -c/w IV Zosyn and Vancomycin  -c/w home Lipitor   -pain control    HLD:  -c/w lipitor    Diet: DASH  Activity: ambulate as tolerated  DVT PPX: SQH  Dispo: pending improvement

## 2024-05-29 NOTE — DIETITIAN INITIAL EVALUATION ADULT - ADD RECOMMEND
1. Monitor PO intake/appetite, GI distress, diet tolerance, labs, weights.  2. Honor pt food preferences as able.  3. Continue MVI. Add VitC-500mg/day.  4. RD to remain available for additional nutrition interventions as needed.

## 2024-05-30 LAB
ANION GAP SERPL CALC-SCNC: 11 MMOL/L — SIGNIFICANT CHANGE UP (ref 5–17)
BUN SERPL-MCNC: 14 MG/DL — SIGNIFICANT CHANGE UP (ref 7–23)
CALCIUM SERPL-MCNC: 10 MG/DL — SIGNIFICANT CHANGE UP (ref 8.4–10.5)
CHLORIDE SERPL-SCNC: 103 MMOL/L — SIGNIFICANT CHANGE UP (ref 96–108)
CHOLEST SERPL-MCNC: 116 MG/DL — SIGNIFICANT CHANGE UP
CO2 SERPL-SCNC: 22 MMOL/L — SIGNIFICANT CHANGE UP (ref 22–31)
CREAT SERPL-MCNC: 0.65 MG/DL — SIGNIFICANT CHANGE UP (ref 0.5–1.3)
EGFR: 90 ML/MIN/1.73M2 — SIGNIFICANT CHANGE UP
GLUCOSE SERPL-MCNC: 90 MG/DL — SIGNIFICANT CHANGE UP (ref 70–99)
HCT VFR BLD CALC: 38.5 % — SIGNIFICANT CHANGE UP (ref 34.5–45)
HDLC SERPL-MCNC: 52 MG/DL — SIGNIFICANT CHANGE UP
HGB BLD-MCNC: 12.6 G/DL — SIGNIFICANT CHANGE UP (ref 11.5–15.5)
LIPID PNL WITH DIRECT LDL SERPL: 48 MG/DL — SIGNIFICANT CHANGE UP
MAGNESIUM SERPL-MCNC: 2.2 MG/DL — SIGNIFICANT CHANGE UP (ref 1.6–2.6)
MCHC RBC-ENTMCNC: 29.6 PG — SIGNIFICANT CHANGE UP (ref 27–34)
MCHC RBC-ENTMCNC: 32.7 GM/DL — SIGNIFICANT CHANGE UP (ref 32–36)
MCV RBC AUTO: 90.6 FL — SIGNIFICANT CHANGE UP (ref 80–100)
NON HDL CHOLESTEROL: 64 MG/DL — SIGNIFICANT CHANGE UP
NRBC # BLD: 0 /100 WBCS — SIGNIFICANT CHANGE UP (ref 0–0)
PHOSPHATE SERPL-MCNC: 3.2 MG/DL — SIGNIFICANT CHANGE UP (ref 2.5–4.5)
PLATELET # BLD AUTO: 339 K/UL — SIGNIFICANT CHANGE UP (ref 150–400)
POTASSIUM SERPL-MCNC: 4.7 MMOL/L — SIGNIFICANT CHANGE UP (ref 3.5–5.3)
POTASSIUM SERPL-SCNC: 4.7 MMOL/L — SIGNIFICANT CHANGE UP (ref 3.5–5.3)
RBC # BLD: 4.25 M/UL — SIGNIFICANT CHANGE UP (ref 3.8–5.2)
RBC # FLD: 13.4 % — SIGNIFICANT CHANGE UP (ref 10.3–14.5)
SODIUM SERPL-SCNC: 136 MMOL/L — SIGNIFICANT CHANGE UP (ref 135–145)
TRIGL SERPL-MCNC: 82 MG/DL — SIGNIFICANT CHANGE UP
WBC # BLD: 9.8 K/UL — SIGNIFICANT CHANGE UP (ref 3.8–10.5)
WBC # FLD AUTO: 9.8 K/UL — SIGNIFICANT CHANGE UP (ref 3.8–10.5)

## 2024-05-30 PROCEDURE — 99232 SBSQ HOSP IP/OBS MODERATE 35: CPT

## 2024-05-30 RX ORDER — CEFEPIME 1 G/1
2000 INJECTION, POWDER, FOR SOLUTION INTRAMUSCULAR; INTRAVENOUS EVERY 12 HOURS
Refills: 0 | Status: DISCONTINUED | OUTPATIENT
Start: 2024-05-30 | End: 2024-05-30

## 2024-05-30 RX ORDER — CEFEPIME 1 G/1
2000 INJECTION, POWDER, FOR SOLUTION INTRAMUSCULAR; INTRAVENOUS EVERY 12 HOURS
Refills: 0 | Status: DISCONTINUED | OUTPATIENT
Start: 2024-05-30 | End: 2024-06-03

## 2024-05-30 RX ADMIN — Medication 325 MILLIGRAM(S): at 14:30

## 2024-05-30 RX ADMIN — PIPERACILLIN AND TAZOBACTAM 25 GRAM(S): 4; .5 INJECTION, POWDER, LYOPHILIZED, FOR SOLUTION INTRAVENOUS at 04:31

## 2024-05-30 RX ADMIN — CEFEPIME 100 MILLIGRAM(S): 1 INJECTION, POWDER, FOR SOLUTION INTRAMUSCULAR; INTRAVENOUS at 11:19

## 2024-05-30 RX ADMIN — Medication 325 MILLIGRAM(S): at 13:27

## 2024-05-30 RX ADMIN — Medication 325 MILLIGRAM(S): at 20:00

## 2024-05-30 RX ADMIN — Medication 500 MILLIGRAM(S): at 11:18

## 2024-05-30 RX ADMIN — ATORVASTATIN CALCIUM 20 MILLIGRAM(S): 80 TABLET, FILM COATED ORAL at 22:04

## 2024-05-30 RX ADMIN — Medication 325 MILLIGRAM(S): at 19:10

## 2024-05-30 RX ADMIN — CEFEPIME 100 MILLIGRAM(S): 1 INJECTION, POWDER, FOR SOLUTION INTRAMUSCULAR; INTRAVENOUS at 22:05

## 2024-05-30 RX ADMIN — Medication 1 TABLET(S): at 11:18

## 2024-05-30 RX ADMIN — HEPARIN SODIUM 5000 UNIT(S): 5000 INJECTION INTRAVENOUS; SUBCUTANEOUS at 22:04

## 2024-05-30 RX ADMIN — HEPARIN SODIUM 5000 UNIT(S): 5000 INJECTION INTRAVENOUS; SUBCUTANEOUS at 11:18

## 2024-05-30 NOTE — PROGRESS NOTE ADULT - SUBJECTIVE AND OBJECTIVE BOX
SUBJECTIVE:  Patient was seen and examined at bedside. Feeling fine, denies any complaints. Switched to Cefepime. Telemetry reviewed.     Review of systems: No fever, chills, dizziness, HA, Changes in vision, CP, dyspnea, nausea or vomiting, dysuria, changes in bowel movements, LE edema. Rest of 12 point Review of systems negative unless otherwise documented elsewhere in note.     Diet, Regular:   DASH/TLC Sodium & Cholesterol Restricted (DASH) (05-24-24 @ 15:17) [Active]      MEDICATIONS:  MEDICATIONS  (STANDING):  ascorbic acid 500 milliGRAM(s) Oral daily  atorvastatin 20 milliGRAM(s) Oral at bedtime  cefepime   IVPB 2000 milliGRAM(s) IV Intermittent every 12 hours  heparin   Injectable 5000 Unit(s) SubCutaneous every 12 hours  multivitamin 1 Tablet(s) Oral daily  polyethylene glycol 3350 17 Gram(s) Oral daily    MEDICATIONS  (PRN):  acetaminophen     Tablet .. 325 milliGRAM(s) Oral every 4 hours PRN Mild Pain (1 - 3)  ondansetron Injectable 4 milliGRAM(s) IV Push every 6 hours PRN Nausea      Allergies    latex (Rash)  minocycline (Rash)    Intolerances        OBJECTIVE:  Vital Signs Last 24 Hrs  T(C): 36.6 (30 May 2024 08:19), Max: 36.8 (29 May 2024 17:05)  T(F): 97.9 (30 May 2024 08:19), Max: 98.2 (29 May 2024 17:05)  HR: 99 (30 May 2024 08:19) (80 - 99)  BP: 141/60 (30 May 2024 08:19) (117/57 - 141/60)  BP(mean): 86 (30 May 2024 05:58) (78 - 86)  RR: 18 (30 May 2024 08:19) (17 - 18)  SpO2: 98% (30 May 2024 08:19) (95% - 100%)    Parameters below as of 30 May 2024 08:19  Patient On (Oxygen Delivery Method): room air      I&O's Summary    29 May 2024 07:01  -  30 May 2024 07:00  --------------------------------------------------------  IN: 680 mL / OUT: 3053 mL / NET: -2373 mL    30 May 2024 07:01  -  30 May 2024 10:57  --------------------------------------------------------  IN: 180 mL / OUT: 1 mL / NET: 179 mL        PHYSICAL EXAM:  General: AOx3, NAD, lying in bed, speaking in full sentences, no labored breathing on RA  HEENT: AT/NC, no facial asymmetry   Lungs: no labored breathing   Abdomen: soft, no tenderness  Extremities:  right foot in dressing, no edema, no tenderness, no focal deficit        LABS:                        12.6   9.80  )-----------( 339      ( 30 May 2024 05:30 )             38.5     05-30    136  |  103  |  14  ----------------------------<  90  4.7   |  22  |  0.65    Ca    10.0      30 May 2024 05:30  Phos  3.2     05-30  Mg     2.2     05-30          CAPILLARY BLOOD GLUCOSE        Urinalysis Basic - ( 30 May 2024 05:30 )    Color: x / Appearance: x / SG: x / pH: x  Gluc: 90 mg/dL / Ketone: x  / Bili: x / Urobili: x   Blood: x / Protein: x / Nitrite: x   Leuk Esterase: x / RBC: x / WBC x   Sq Epi: x / Non Sq Epi: x / Bacteria: x        MICRODATA:      RADIOLOGY/OTHER STUDIES:

## 2024-05-30 NOTE — CHART NOTE - NSCHARTNOTEFT_GEN_A_CORE
Infectious Diseases Anti-infective Approval Note    Medication:  Cefepime  Dose:  2 g  Route:  IV  Frequency:  q12h  Duration**:  7d  Purpose:  (check one)       Empiric pending cultures:       Empiric, no culture data:       Final duration: x    Dose may be adjusted as needed for alterations in renal function.    *THIS IS NOT AN INFECTIOUS DISEASES CONSULTATION*    **Indicates duration of approval, not necessarily duration of treatment

## 2024-05-30 NOTE — PROGRESS NOTE ADULT - SUBJECTIVE AND OBJECTIVE BOX
O/N: CHARLIE NOBLES                   ---------------------------------------------------------------------------  PLEASE CHECK WHEN PRESENT:     [  ] Heart Failure     [  ] Acute     [  ] Acute on Chronic     [  ] Chronic  -------------------------------------------------------------------     [  ]Diastolic [HFpEF]     [  ]Systolic [HFrEF]     [  ]Combined [HFpEF & HFrEF]     [  ] afib     [  ] hypertensive heart disease     [  ]Other:  -------------------------------------------------------------------  [ ] Respiratory failure  [ ] Acute cor pulmonale  [ ] Asthma/COPD Exacerbation  [ ] Pleural effusion  [ ] Aspiration pneumonia  -------------------------------------------------------------------  [  ]GARRETT     [  ]ATN     [  ]Reneal Medullary Necrosis     [  ]Renal Cortical Necrosis     [  ]Other Pathological Lesions:    [  ]CKD 1  [  ]CKD 2  [  ]CKD 3  [  ]CKD 4  [  ]CKD 5  [  ]Other  -------------------------------------------------------------------  [  ]Diabetes  [  ] Diabetic PVD Ulcer  [  ] Neuropathic ulcer to DM  [  ] Diabetes with Nephropathy  [  ] Osteomyelitis due to diabetes  [ ] Hyperglycemia  [ ] Hypoglycemia  --------------------------------------------------------------------  [  ]Malnutrition: See Nutrition Note  [  ]Cachexia  [  ]Other:   [  ]Supplement Ordered:  [  ]Morbid Obesity (BMI >=40]  ---------------------------------------------------------------------  [ ] Sepsis/severe sepsis/septic shock  [ ] UTI  [ ] Pneumonia  -----------------------------------------------------------------------  [ ] Acidosis/alkalosis  [ ] Fluid overload  [X ] Hypokalemia  [ ] Hyperkalemia  [ ] Hypomagnesemia  [ ] Hypophosphatemia  [ ] Hyperphosphatemia  [ ] Hyponatremia  [ ] Hypernatremia  ------------------------------------------------------------------------  [ ] Acute blood loss anemia  [ ] Post op blood loss anemia  [ ] Iron deficiency anemia  [ ] Anemia due to chronic disease  [ ] Hypercoagulable state  [ ] Leukocytosis  ----------------------------------------------------------------------  [ ] Cerebral infarction  [ ] Transient ischemia attack  [ ] Encephalopathy      A/P: 80 y/o F with PMHx of HLD, hypercalcemia, Schamberg's, Rosacea, R GSV reflux, known to the vascular surgery service, being admitted for infected right medial malleolar venous ulcer, In the ED, patient afebrile, normotensive, right medial malleolar ulcer measuring about 3 x4 cm, with fibrinous background, non tender, foul smelling (culture sent), bilateral hyperpigmentation on lower extremities.    Vascular:   -BID dressing changes with wet to dry dressings  -Wound cx 5/24: Enterobacter and Pseudomonas  -Blood cx: NGTD  -c/w IV Zosyn and Vancomycin  -c/w home Lipitor   -pain control    HLD:  -c/w lipitor    Diet: DASH  Activity: ambulate as tolerated  DVT PPX: SQH  Dispo: pending improvement   O/N: MALLORIE, VSS     Subjective: Patient seen at bedside with chief resident. Patient has no complaints. Patient doing well. Endorses understanding of clinical plan.     ROS:   Denies Headache, blurred vision, Chest Pain, SOB, Abdominal pain, nausea or vomiting     Social   piperacillin/tazobactam IVPB.. 3.375  heparin   Injectable 5000  piperacillin/tazobactam IVPB.. 3.375      Allergies    latex (Rash)  minocycline (Rash)    Intolerances        Vital Signs Last 24 Hrs  T(C): 36.6 (30 May 2024 05:58), Max: 36.8 (29 May 2024 17:05)  T(F): 97.8 (30 May 2024 05:58), Max: 98.2 (29 May 2024 17:05)  HR: 80 (30 May 2024 05:58) (80 - 90)  BP: 128/60 (30 May 2024 05:58) (117/57 - 130/58)  BP(mean): 86 (30 May 2024 05:58) (78 - 86)  RR: 17 (30 May 2024 05:58) (17 - 18)  SpO2: 95% (30 May 2024 05:58) (95% - 100%)    Parameters below as of 30 May 2024 05:58  Patient On (Oxygen Delivery Method): room air      I&O's Summary    28 May 2024 07:01  -  29 May 2024 07:00  --------------------------------------------------------  IN: 660 mL / OUT: 3201 mL / NET: -2541 mL    29 May 2024 07:01  -  30 May 2024 06:52  --------------------------------------------------------  IN: 680 mL / OUT: 3053 mL / NET: -2373 mL        Physical Exam:  General: no acute distress  Pulmonary: no accessory muscle use   Cardiovascular: NSR  Abdominal: soft, NT  Extremities: R foot- medial malleolar venous ulcer with fibrinous tissue forming within wound, no crepitus, no drainage, no foul odor, varicose and spider veins noted on b/l LE.           LABS:                        12.6   9.80  )-----------( 339      ( 30 May 2024 05:30 )             38.5     05-29    136  |  100  |  13  ----------------------------<  84  4.0   |  25  |  0.59    Ca    10.1      29 May 2024 08:32  Phos  3.2     05-29  Mg     2.1     05-29          Radiology and Additional Studies:      ---------------------------------------------------------------------------  PLEASE CHECK WHEN PRESENT:     [  ] Heart Failure     [  ] Acute     [  ] Acute on Chronic     [  ] Chronic  -------------------------------------------------------------------     [  ]Diastolic [HFpEF]     [  ]Systolic [HFrEF]     [  ]Combined [HFpEF & HFrEF]     [  ] afib     [  ] hypertensive heart disease     [  ]Other:  -------------------------------------------------------------------  [ ] Respiratory failure  [ ] Acute cor pulmonale  [ ] Asthma/COPD Exacerbation  [ ] Pleural effusion  [ ] Aspiration pneumonia  -------------------------------------------------------------------  [  ]GARRETT     [  ]ATN     [  ]Reneal Medullary Necrosis     [  ]Renal Cortical Necrosis     [  ]Other Pathological Lesions:    [  ]CKD 1  [  ]CKD 2  [  ]CKD 3  [  ]CKD 4  [  ]CKD 5  [  ]Other  -------------------------------------------------------------------  [  ]Diabetes  [  ] Diabetic PVD Ulcer  [  ] Neuropathic ulcer to DM  [  ] Diabetes with Nephropathy  [  ] Osteomyelitis due to diabetes  [ ] Hyperglycemia  [ ] Hypoglycemia  --------------------------------------------------------------------  [  ]Malnutrition: See Nutrition Note  [  ]Cachexia  [  ]Other:   [  ]Supplement Ordered:  [  ]Morbid Obesity (BMI >=40]  ---------------------------------------------------------------------  [ ] Sepsis/severe sepsis/septic shock  [ ] UTI  [ ] Pneumonia  -----------------------------------------------------------------------  [ ] Acidosis/alkalosis  [ ] Fluid overload  [X ] Hypokalemia  [ ] Hyperkalemia  [ ] Hypomagnesemia  [ ] Hypophosphatemia  [ ] Hyperphosphatemia  [ ] Hyponatremia  [ ] Hypernatremia  ------------------------------------------------------------------------  [ ] Acute blood loss anemia  [ ] Post op blood loss anemia  [ ] Iron deficiency anemia  [ ] Anemia due to chronic disease  [ ] Hypercoagulable state  [ ] Leukocytosis  ----------------------------------------------------------------------  [ ] Cerebral infarction  [ ] Transient ischemia attack  [ ] Encephalopathy      A/P: 80 y/o F with PMHx of HLD, hypercalcemia, Schamberg's, Rosacea, R GSV reflux, known to the vascular surgery service, being admitted for infected right medial malleolar venous ulcer, In the ED, patient afebrile, normotensive, right medial malleolar ulcer measuring about 3 x4 cm, with fibrinous background, non tender, foul smelling (culture sent), bilateral hyperpigmentation on lower extremities. Wound continues to improve with scab forming within wound and still no pus or drainage or foul odor noted.     Vascular:   -BID dressing changes with wet to dry dressings  -Wound cx 5/24: Enterobacter and Pseudomonas  -Blood cx: NGTD  -c/w IV Zosyn and Vancomycin  -c/w home Lipitor   -pain control    HLD:  -c/w lipitor    Diet: DASH  Activity: ambulate as tolerated  DVT PPX: SQH  Dispo: Home PT with rollator

## 2024-05-31 LAB
ANION GAP SERPL CALC-SCNC: 11 MMOL/L — SIGNIFICANT CHANGE UP (ref 5–17)
BUN SERPL-MCNC: 15 MG/DL — SIGNIFICANT CHANGE UP (ref 7–23)
CALCIUM SERPL-MCNC: 9.8 MG/DL — SIGNIFICANT CHANGE UP (ref 8.4–10.5)
CHLORIDE SERPL-SCNC: 105 MMOL/L — SIGNIFICANT CHANGE UP (ref 96–108)
CO2 SERPL-SCNC: 20 MMOL/L — LOW (ref 22–31)
CREAT SERPL-MCNC: 0.52 MG/DL — SIGNIFICANT CHANGE UP (ref 0.5–1.3)
EGFR: 95 ML/MIN/1.73M2 — SIGNIFICANT CHANGE UP
GLUCOSE SERPL-MCNC: 84 MG/DL — SIGNIFICANT CHANGE UP (ref 70–99)
HCT VFR BLD CALC: 37.7 % — SIGNIFICANT CHANGE UP (ref 34.5–45)
HGB BLD-MCNC: 11.9 G/DL — SIGNIFICANT CHANGE UP (ref 11.5–15.5)
MAGNESIUM SERPL-MCNC: 2 MG/DL — SIGNIFICANT CHANGE UP (ref 1.6–2.6)
MCHC RBC-ENTMCNC: 29.1 PG — SIGNIFICANT CHANGE UP (ref 27–34)
MCHC RBC-ENTMCNC: 31.6 GM/DL — LOW (ref 32–36)
MCV RBC AUTO: 92.2 FL — SIGNIFICANT CHANGE UP (ref 80–100)
NRBC # BLD: 0 /100 WBCS — SIGNIFICANT CHANGE UP (ref 0–0)
PHOSPHATE SERPL-MCNC: 3 MG/DL — SIGNIFICANT CHANGE UP (ref 2.5–4.5)
PLATELET # BLD AUTO: 354 K/UL — SIGNIFICANT CHANGE UP (ref 150–400)
POTASSIUM SERPL-MCNC: 3.8 MMOL/L — SIGNIFICANT CHANGE UP (ref 3.5–5.3)
POTASSIUM SERPL-SCNC: 3.8 MMOL/L — SIGNIFICANT CHANGE UP (ref 3.5–5.3)
RBC # BLD: 4.09 M/UL — SIGNIFICANT CHANGE UP (ref 3.8–5.2)
RBC # FLD: 13.2 % — SIGNIFICANT CHANGE UP (ref 10.3–14.5)
SODIUM SERPL-SCNC: 136 MMOL/L — SIGNIFICANT CHANGE UP (ref 135–145)
WBC # BLD: 9.34 K/UL — SIGNIFICANT CHANGE UP (ref 3.8–10.5)
WBC # FLD AUTO: 9.34 K/UL — SIGNIFICANT CHANGE UP (ref 3.8–10.5)

## 2024-05-31 PROCEDURE — 99232 SBSQ HOSP IP/OBS MODERATE 35: CPT

## 2024-05-31 RX ORDER — POTASSIUM CHLORIDE 20 MEQ
20 PACKET (EA) ORAL ONCE
Refills: 0 | Status: COMPLETED | OUTPATIENT
Start: 2024-05-31 | End: 2024-05-31

## 2024-05-31 RX ORDER — PSYLLIUM SEED (WITH DEXTROSE)
1 POWDER (GRAM) ORAL DAILY
Refills: 0 | Status: DISCONTINUED | OUTPATIENT
Start: 2024-05-31 | End: 2024-06-03

## 2024-05-31 RX ORDER — LACTOBACILLUS ACIDOPHILUS 100MM CELL
1 CAPSULE ORAL DAILY
Refills: 0 | Status: DISCONTINUED | OUTPATIENT
Start: 2024-05-31 | End: 2024-06-03

## 2024-05-31 RX ADMIN — HEPARIN SODIUM 5000 UNIT(S): 5000 INJECTION INTRAVENOUS; SUBCUTANEOUS at 09:09

## 2024-05-31 RX ADMIN — Medication 20 MILLIEQUIVALENT(S): at 10:36

## 2024-05-31 RX ADMIN — ATORVASTATIN CALCIUM 20 MILLIGRAM(S): 80 TABLET, FILM COATED ORAL at 21:44

## 2024-05-31 RX ADMIN — Medication 1 TABLET(S): at 10:37

## 2024-05-31 RX ADMIN — CEFEPIME 100 MILLIGRAM(S): 1 INJECTION, POWDER, FOR SOLUTION INTRAMUSCULAR; INTRAVENOUS at 09:10

## 2024-05-31 RX ADMIN — CEFEPIME 100 MILLIGRAM(S): 1 INJECTION, POWDER, FOR SOLUTION INTRAMUSCULAR; INTRAVENOUS at 21:44

## 2024-05-31 RX ADMIN — Medication 1 TABLET(S): at 11:51

## 2024-05-31 RX ADMIN — Medication 500 MILLIGRAM(S): at 10:36

## 2024-05-31 RX ADMIN — Medication 325 MILLIGRAM(S): at 15:54

## 2024-05-31 RX ADMIN — Medication 325 MILLIGRAM(S): at 16:54

## 2024-05-31 RX ADMIN — HEPARIN SODIUM 5000 UNIT(S): 5000 INJECTION INTRAVENOUS; SUBCUTANEOUS at 21:43

## 2024-05-31 NOTE — PROGRESS NOTE ADULT - SUBJECTIVE AND OBJECTIVE BOX
SUBJECTIVE:  Patient was seen and examined at bedside. Reports feeling at baseline, reports frequent stools, denies diarrhea, no abdominal pain, no N/V. No other complaints or events reported.    Review of systems: No fever, chills, dizziness, HA, Changes in vision, CP, dyspnea, nausea or vomiting, dysuria, LE edema. Rest of 12 point Review of systems negative unless otherwise documented elsewhere in note.     Diet, Regular:   DASH/TLC Sodium & Cholesterol Restricted (DASH) (05-24-24 @ 15:17) [Active]      MEDICATIONS:  MEDICATIONS  (STANDING):  ascorbic acid 500 milliGRAM(s) Oral daily  atorvastatin 20 milliGRAM(s) Oral at bedtime  cefepime   IVPB 2000 milliGRAM(s) IV Intermittent every 12 hours  heparin   Injectable 5000 Unit(s) SubCutaneous every 12 hours  lactobacillus acidophilus 1 Tablet(s) Oral daily  multivitamin 1 Tablet(s) Oral daily  polyethylene glycol 3350 17 Gram(s) Oral daily  psyllium Powder 1 Packet(s) Oral daily    MEDICATIONS  (PRN):  acetaminophen     Tablet .. 325 milliGRAM(s) Oral every 4 hours PRN Mild Pain (1 - 3)  ondansetron Injectable 4 milliGRAM(s) IV Push every 6 hours PRN Nausea      Allergies    latex (Rash)  minocycline (Rash)    Intolerances        OBJECTIVE:  Vital Signs Last 24 Hrs  T(C): 36.6 (31 May 2024 08:31), Max: 36.6 (30 May 2024 15:44)  T(F): 97.8 (31 May 2024 08:31), Max: 97.9 (30 May 2024 15:44)  HR: 81 (31 May 2024 12:08) (79 - 87)  BP: 132/62 (31 May 2024 12:08) (121/58 - 148/59)  BP(mean): 84 (31 May 2024 08:31) (84 - 85)  RR: 16 (31 May 2024 12:08) (16 - 18)  SpO2: 100% (31 May 2024 12:08) (98% - 100%)    Parameters below as of 31 May 2024 12:08  Patient On (Oxygen Delivery Method): room air      I&O's Summary    30 May 2024 07:01  -  31 May 2024 07:00  --------------------------------------------------------  IN: 710 mL / OUT: 2601 mL / NET: -1891 mL    31 May 2024 07:01  -  31 May 2024 12:37  --------------------------------------------------------  IN: 120 mL / OUT: 600 mL / NET: -480 mL        PHYSICAL EXAM:  General: AOx3, NAD, lying in bed, speaking in full sentences, no labored breathing on RA  HEENT: AT/NC, no facial asymmetry   Lungs: no labored breathing   Heart: regular   Abdomen: soft, no distention, no tenderness, + BS  Extremities:  right foot in dressing, no edema, no tenderness, no focal deficit      LABS:                        11.9   9.34  )-----------( 354      ( 31 May 2024 05:30 )             37.7     05-31    136  |  105  |  15  ----------------------------<  84  3.8   |  20<L>  |  0.52    Ca    9.8      31 May 2024 05:30  Phos  3.0     05-31  Mg     2.0     05-31          CAPILLARY BLOOD GLUCOSE        Urinalysis Basic - ( 31 May 2024 05:30 )    Color: x / Appearance: x / SG: x / pH: x  Gluc: 84 mg/dL / Ketone: x  / Bili: x / Urobili: x   Blood: x / Protein: x / Nitrite: x   Leuk Esterase: x / RBC: x / WBC x   Sq Epi: x / Non Sq Epi: x / Bacteria: x        MICRODATA:      RADIOLOGY/OTHER STUDIES:

## 2024-05-31 NOTE — PROGRESS NOTE ADULT - SUBJECTIVE AND OBJECTIVE BOX
O/N: CHARLIE NOBLES                              ---------------------------------------------------------------------------  PLEASE CHECK WHEN PRESENT:     [  ] Heart Failure     [  ] Acute     [  ] Acute on Chronic     [  ] Chronic  -------------------------------------------------------------------     [  ]Diastolic [HFpEF]     [  ]Systolic [HFrEF]     [  ]Combined [HFpEF & HFrEF]     [  ] afib     [  ] hypertensive heart disease     [  ]Other:  -------------------------------------------------------------------  [ ] Respiratory failure  [ ] Acute cor pulmonale  [ ] Asthma/COPD Exacerbation  [ ] Pleural effusion  [ ] Aspiration pneumonia  -------------------------------------------------------------------  [  ]GARRETT     [  ]ATN     [  ]Reneal Medullary Necrosis     [  ]Renal Cortical Necrosis     [  ]Other Pathological Lesions:    [  ]CKD 1  [  ]CKD 2  [  ]CKD 3  [  ]CKD 4  [  ]CKD 5  [  ]Other  -------------------------------------------------------------------  [  ]Diabetes  [  ] Diabetic PVD Ulcer  [  ] Neuropathic ulcer to DM  [  ] Diabetes with Nephropathy  [  ] Osteomyelitis due to diabetes  [ ] Hyperglycemia  [ ] Hypoglycemia  --------------------------------------------------------------------  [  ]Malnutrition: See Nutrition Note  [  ]Cachexia  [  ]Other:   [  ]Supplement Ordered:  [  ]Morbid Obesity (BMI >=40]  ---------------------------------------------------------------------  [ ] Sepsis/severe sepsis/septic shock  [ ] UTI  [ ] Pneumonia  -----------------------------------------------------------------------  [ ] Acidosis/alkalosis  [ ] Fluid overload  [X ] Hypokalemia  [ ] Hyperkalemia  [ ] Hypomagnesemia  [ ] Hypophosphatemia  [ ] Hyperphosphatemia  [ ] Hyponatremia  [ ] Hypernatremia  ------------------------------------------------------------------------  [ ] Acute blood loss anemia  [ ] Post op blood loss anemia  [ ] Iron deficiency anemia  [ ] Anemia due to chronic disease  [ ] Hypercoagulable state  [ ] Leukocytosis  ----------------------------------------------------------------------  [ ] Cerebral infarction  [ ] Transient ischemia attack  [ ] Encephalopathy      A/P: 80 y/o F with PMHx of HLD, hypercalcemia, Schamberg's, Rosacea, R GSV reflux, known to the vascular surgery service, being admitted for infected right medial malleolar venous ulcer, In the ED, patient afebrile, normotensive, right medial malleolar ulcer measuring about 3 x4 cm, with fibrinous background, non tender, foul smelling (culture sent), bilateral hyperpigmentation on lower extremities. Wound continues to improve with scab forming within wound and still no pus or drainage or foul odor noted.     Vascular:   -BID dressing changes with wet to dry dressings  -Wound cx 5/24: Enterobacter and Pseudomonas  -Blood cx: NGTD  -c/w IV Zosyn and Vancomycin  -c/w home Lipitor   -pain control    HLD:  -c/w lipitor    Diet: DASH  Activity: ambulate as tolerated  DVT PPX: SQH  Dispo: Home PT with rollator      O/N: MALLORIE, VSS    Subjective:   Patient seen and evaluated on AM rounds. Patient has no complaints at this time. She is feeling well, denies fevers, chill, SOB, CP, papitations, RLE pain is well controlled. She got out of bed yesterday and will do so today. Dressing changed at bedside.     ROS:   Denies Headache, blurred vision, Chest Pain, SOB, Abdominal pain, nausea or vomiting     Social   cefepime   IVPB 2000  cefepime   IVPB 2000  heparin   Injectable 5000      Allergies    latex (Rash)  minocycline (Rash)    Intolerances        Vital Signs Last 24 Hrs  T(C): 36.6 (31 May 2024 04:16), Max: 36.6 (30 May 2024 08:19)  T(F): 97.9 (31 May 2024 04:16), Max: 97.9 (30 May 2024 08:19)  HR: 80 (31 May 2024 04:16) (80 - 99)  BP: 148/59 (31 May 2024 04:16) (110/63 - 148/59)  BP(mean): 85 (31 May 2024 04:16) (85 - 85)  RR: 17 (31 May 2024 04:16) (17 - 18)  SpO2: 99% (31 May 2024 04:16) (98% - 100%)    Parameters below as of 31 May 2024 04:16  Patient On (Oxygen Delivery Method): room air      I&O's Summary    30 May 2024 07:01  -  31 May 2024 07:00  --------------------------------------------------------  IN: 710 mL / OUT: 2601 mL / NET: -1891 mL        Physical Exam:  GENERAL:         Well appearing woman in NAD, afebrile, cooperating appropriately with exam, speaking in full sentences  HEART:              Regular rate and rhythm  LUNGS:              Non-labored breathing, regular rate, on room air.   EXTREMITIES:     R foot-medial malleolar venous ulcer with fibrinous tissue forming  within wound, no crepitus, no drainage, no foul odor, varicose and spider veins noted on b/l LE  NEURO:             Normal gait. Grossly nonfocal.   PSYCH:              A&O x3, cooperative with exam.  Asked questions appropriately.  Normal mood and affect.        LABS:                        11.9   9.34  )-----------( 354      ( 31 May 2024 05:30 )             37.7     05-31    136  |  105  |  15  ----------------------------<  84  3.8   |  20<L>  |  0.52    Ca    9.8      31 May 2024 05:30  Phos  3.0     05-31  Mg     2.0     05-31        ---------------------------------------------------------------------------  PLEASE CHECK WHEN PRESENT:     [  ] Heart Failure     [  ] Acute     [  ] Acute on Chronic     [  ] Chronic  -------------------------------------------------------------------     [  ]Diastolic [HFpEF]     [  ]Systolic [HFrEF]     [  ]Combined [HFpEF & HFrEF]     [  ] afib     [  ] hypertensive heart disease     [  ]Other:  -------------------------------------------------------------------  [ ] Respiratory failure  [ ] Acute cor pulmonale  [ ] Asthma/COPD Exacerbation  [ ] Pleural effusion  [ ] Aspiration pneumonia  -------------------------------------------------------------------  [  ]GARRETT     [  ]ATN     [  ]Reneal Medullary Necrosis     [  ]Renal Cortical Necrosis     [  ]Other Pathological Lesions:    [  ]CKD 1  [  ]CKD 2  [  ]CKD 3  [  ]CKD 4  [  ]CKD 5  [  ]Other  -------------------------------------------------------------------  [  ]Diabetes  [  ] Diabetic PVD Ulcer  [  ] Neuropathic ulcer to DM  [  ] Diabetes with Nephropathy  [  ] Osteomyelitis due to diabetes  [ ] Hyperglycemia  [ ] Hypoglycemia  --------------------------------------------------------------------  [  ]Malnutrition: See Nutrition Note  [  ]Cachexia  [  ]Other:   [  ]Supplement Ordered:  [  ]Morbid Obesity (BMI >=40]  ---------------------------------------------------------------------  [ ] Sepsis/severe sepsis/septic shock  [ ] UTI  [ ] Pneumonia  -----------------------------------------------------------------------  [ ] Acidosis/alkalosis  [ ] Fluid overload  [X ] Hypokalemia  [ ] Hyperkalemia  [ ] Hypomagnesemia  [ ] Hypophosphatemia  [ ] Hyperphosphatemia  [ ] Hyponatremia  [ ] Hypernatremia  ------------------------------------------------------------------------  [ ] Acute blood loss anemia  [ ] Post op blood loss anemia  [ ] Iron deficiency anemia  [ ] Anemia due to chronic disease  [ ] Hypercoagulable state  [ ] Leukocytosis  ----------------------------------------------------------------------  [ ] Cerebral infarction  [ ] Transient ischemia attack  [ ] Encephalopathy

## 2024-06-01 LAB
ANION GAP SERPL CALC-SCNC: 9 MMOL/L — SIGNIFICANT CHANGE UP (ref 5–17)
BUN SERPL-MCNC: 15 MG/DL — SIGNIFICANT CHANGE UP (ref 7–23)
CALCIUM SERPL-MCNC: 10.3 MG/DL — SIGNIFICANT CHANGE UP (ref 8.4–10.5)
CHLORIDE SERPL-SCNC: 102 MMOL/L — SIGNIFICANT CHANGE UP (ref 96–108)
CO2 SERPL-SCNC: 26 MMOL/L — SIGNIFICANT CHANGE UP (ref 22–31)
CREAT SERPL-MCNC: 0.49 MG/DL — LOW (ref 0.5–1.3)
EGFR: 96 ML/MIN/1.73M2 — SIGNIFICANT CHANGE UP
GLUCOSE SERPL-MCNC: 88 MG/DL — SIGNIFICANT CHANGE UP (ref 70–99)
HCT VFR BLD CALC: 41.5 % — SIGNIFICANT CHANGE UP (ref 34.5–45)
HGB BLD-MCNC: 13.3 G/DL — SIGNIFICANT CHANGE UP (ref 11.5–15.5)
MAGNESIUM SERPL-MCNC: 2.1 MG/DL — SIGNIFICANT CHANGE UP (ref 1.6–2.6)
MCHC RBC-ENTMCNC: 29.5 PG — SIGNIFICANT CHANGE UP (ref 27–34)
MCHC RBC-ENTMCNC: 32 GM/DL — SIGNIFICANT CHANGE UP (ref 32–36)
MCV RBC AUTO: 92 FL — SIGNIFICANT CHANGE UP (ref 80–100)
NRBC # BLD: 0 /100 WBCS — SIGNIFICANT CHANGE UP (ref 0–0)
PHOSPHATE SERPL-MCNC: 3.1 MG/DL — SIGNIFICANT CHANGE UP (ref 2.5–4.5)
PLATELET # BLD AUTO: 332 K/UL — SIGNIFICANT CHANGE UP (ref 150–400)
POTASSIUM SERPL-MCNC: 4 MMOL/L — SIGNIFICANT CHANGE UP (ref 3.5–5.3)
POTASSIUM SERPL-SCNC: 4 MMOL/L — SIGNIFICANT CHANGE UP (ref 3.5–5.3)
RBC # BLD: 4.51 M/UL — SIGNIFICANT CHANGE UP (ref 3.8–5.2)
RBC # FLD: 13.6 % — SIGNIFICANT CHANGE UP (ref 10.3–14.5)
SODIUM SERPL-SCNC: 137 MMOL/L — SIGNIFICANT CHANGE UP (ref 135–145)
WBC # BLD: 8.36 K/UL — SIGNIFICANT CHANGE UP (ref 3.8–10.5)
WBC # FLD AUTO: 8.36 K/UL — SIGNIFICANT CHANGE UP (ref 3.8–10.5)

## 2024-06-01 PROCEDURE — 99232 SBSQ HOSP IP/OBS MODERATE 35: CPT | Mod: GC

## 2024-06-01 RX ADMIN — Medication 325 MILLIGRAM(S): at 15:36

## 2024-06-01 RX ADMIN — CEFEPIME 100 MILLIGRAM(S): 1 INJECTION, POWDER, FOR SOLUTION INTRAMUSCULAR; INTRAVENOUS at 21:36

## 2024-06-01 RX ADMIN — HEPARIN SODIUM 5000 UNIT(S): 5000 INJECTION INTRAVENOUS; SUBCUTANEOUS at 21:36

## 2024-06-01 RX ADMIN — HEPARIN SODIUM 5000 UNIT(S): 5000 INJECTION INTRAVENOUS; SUBCUTANEOUS at 09:48

## 2024-06-01 RX ADMIN — Medication 1 TABLET(S): at 11:02

## 2024-06-01 RX ADMIN — ATORVASTATIN CALCIUM 20 MILLIGRAM(S): 80 TABLET, FILM COATED ORAL at 21:36

## 2024-06-01 RX ADMIN — Medication 500 MILLIGRAM(S): at 11:02

## 2024-06-01 RX ADMIN — CEFEPIME 100 MILLIGRAM(S): 1 INJECTION, POWDER, FOR SOLUTION INTRAMUSCULAR; INTRAVENOUS at 09:48

## 2024-06-01 RX ADMIN — Medication 325 MILLIGRAM(S): at 14:36

## 2024-06-01 NOTE — PROGRESS NOTE ADULT - SUBJECTIVE AND OBJECTIVE BOX
O/N: CHARLIE NOBLES      -------------------------------------------------------------------------  PLEASE CHECK WHEN PRESENT:     [  ] Heart Failure     [  ] Acute     [  ] Acute on Chronic     [  ] Chronic  -------------------------------------------------------------------     [  ]Diastolic [HFpEF]     [  ]Systolic [HFrEF]     [  ]Combined [HFpEF & HFrEF]     [  ] afib     [  ] hypertensive heart disease     [  ]Other:  -------------------------------------------------------------------  [ ] Respiratory failure  [ ] Acute cor pulmonale  [ ] Asthma/COPD Exacerbation  [ ] Pleural effusion  [ ] Aspiration pneumonia  -------------------------------------------------------------------  [  ]GARRETT     [  ]ATN     [  ]Reneal Medullary Necrosis     [  ]Renal Cortical Necrosis     [  ]Other Pathological Lesions:    [  ]CKD 1  [  ]CKD 2  [  ]CKD 3  [  ]CKD 4  [  ]CKD 5  [  ]Other  -------------------------------------------------------------------  [  ]Diabetes  [  ] Diabetic PVD Ulcer  [  ] Neuropathic ulcer to DM  [  ] Diabetes with Nephropathy  [  ] Osteomyelitis due to diabetes  [ ] Hyperglycemia  [ ] Hypoglycemia  --------------------------------------------------------------------  [  ]Malnutrition: See Nutrition Note  [  ]Cachexia  [  ]Other:   [  ]Supplement Ordered:  [  ]Morbid Obesity (BMI >=40]  ---------------------------------------------------------------------  [ ] Sepsis/severe sepsis/septic shock  [ ] UTI  [ ] Pneumonia  -----------------------------------------------------------------------  [ ] Acidosis/alkalosis  [ ] Fluid overload  [X ] Hypokalemia  [ ] Hyperkalemia  [ ] Hypomagnesemia  [ ] Hypophosphatemia  [ ] Hyperphosphatemia  [ ] Hyponatremia  [ ] Hypernatremia  ------------------------------------------------------------------------  [ ] Acute blood loss anemia  [ ] Post op blood loss anemia  [ ] Iron deficiency anemia  [ ] Anemia due to chronic disease  [ ] Hypercoagulable state  [ ] Leukocytosis  ----------------------------------------------------------------------  [ ] Cerebral infarction  [ ] Transient ischemia attack  [ ] Encephalopathy      Assessment and Plan:   A/P: 80 y/o F with PMHx of HLD, hypercalcemia, Schamberg's, Rosacea, R GSV reflux, known to the vascular surgery service, being admitted for infected right medial malleolar venous ulcer, In the ED, patient afebrile, normotensive, right medial malleolar ulcer measuring about 3 x4 cm, with fibrinous background, non tender, foul smelling (culture sent), bilateral hyperpigmentation on lower extremities. Wound continues to improve with scab forming within wound and still no pus or drainage or foul odor noted.     Vascular:   -BID dressing changes with wet to dry dressings, cleansed w/ hydrogen peroxide  -Wound cx 5/24: Enterobacter and Pseudomonas  -Blood cx: NGTD  -c/w IV Cefepime, plan to d/c with no abx  -c/w home Lipitor   -pain control    HLD:  -c/w lipitor    Diet: DASH  Activity: ambulate as tolerated  DVT PPX: SQH  Dispo: Home PT with rollator    O/N: MALLORIE, VSS  Subjective: Patient examined bedside, NAC. Dressings changed bedside. Discussed teaching brother how to do dressings at PM dressing change today.    ROS:   Denies Headache, blurred vision, Chest Pain, SOB, Abdominal pain, nausea or vomiting     Social   cefepime   IVPB 2000  cefepime   IVPB 2000  heparin   Injectable 5000      Allergies    latex (Rash)  minocycline (Rash)    Intolerances        Vital Signs Last 24 Hrs  T(C): 36.4 (01 Jun 2024 08:09), Max: 36.6 (31 May 2024 14:47)  T(F): 97.5 (01 Jun 2024 08:09), Max: 97.8 (31 May 2024 14:47)  HR: 82 (01 Jun 2024 08:09) (77 - 100)  BP: 137/83 (01 Jun 2024 08:09) (115/56 - 149/68)  BP(mean): 91 (01 Jun 2024 08:09) (79 - 91)  RR: 16 (01 Jun 2024 08:09) (16 - 18)  SpO2: 99% (01 Jun 2024 08:09) (92% - 100%)    Parameters below as of 01 Jun 2024 08:09  Patient On (Oxygen Delivery Method): room air      I&O's Summary    31 May 2024 07:01  -  01 Jun 2024 07:00  --------------------------------------------------------  IN: 760 mL / OUT: 2050 mL / NET: -1290 mL    01 Jun 2024 07:01  -  01 Jun 2024 10:27  --------------------------------------------------------  IN: 120 mL / OUT: 0 mL / NET: 120 mL        Physical Exam:  General: no acute distress  Pulmonary: no accessory muscle use   Cardiovascular: NSR  Abdominal: soft, NT  Extremities: R foot- medial malleolar venous ulcer with dark eschar that was debrided with 11 blade.  Wound bed underneath pink with good bleeding and some fibrinous tissue forming. no crepitus, no drainage, no foul odor, varicose and spider veins noted on b/l LE.     LABS:                        13.3   8.36  )-----------( 332      ( 01 Jun 2024 07:26 )             41.5     06-01    137  |  102  |  15  ----------------------------<  88  4.0   |  26  |  0.49<L>    Ca    10.3      01 Jun 2024 07:26  Phos  3.1     06-01  Mg     2.1     06-01          Radiology and Additional Studies:        -------------------------------------------------------------------------  PLEASE CHECK WHEN PRESENT:     [  ] Heart Failure     [  ] Acute     [  ] Acute on Chronic     [  ] Chronic  -------------------------------------------------------------------     [  ]Diastolic [HFpEF]     [  ]Systolic [HFrEF]     [  ]Combined [HFpEF & HFrEF]     [  ] afib     [  ] hypertensive heart disease     [  ]Other:  -------------------------------------------------------------------  [ ] Respiratory failure  [ ] Acute cor pulmonale  [ ] Asthma/COPD Exacerbation  [ ] Pleural effusion  [ ] Aspiration pneumonia  -------------------------------------------------------------------  [  ]GARRETT     [  ]ATN     [  ]Reneal Medullary Necrosis     [  ]Renal Cortical Necrosis     [  ]Other Pathological Lesions:    [  ]CKD 1  [  ]CKD 2  [  ]CKD 3  [  ]CKD 4  [  ]CKD 5  [  ]Other  -------------------------------------------------------------------  [  ]Diabetes  [  ] Diabetic PVD Ulcer  [  ] Neuropathic ulcer to DM  [  ] Diabetes with Nephropathy  [  ] Osteomyelitis due to diabetes  [ ] Hyperglycemia  [ ] Hypoglycemia  --------------------------------------------------------------------  [  ]Malnutrition: See Nutrition Note  [  ]Cachexia  [  ]Other:   [  ]Supplement Ordered:  [  ]Morbid Obesity (BMI >=40]  ---------------------------------------------------------------------  [ ] Sepsis/severe sepsis/septic shock  [ ] UTI  [ ] Pneumonia  -----------------------------------------------------------------------  [ ] Acidosis/alkalosis  [ ] Fluid overload  [X ] Hypokalemia  [ ] Hyperkalemia  [ ] Hypomagnesemia  [ ] Hypophosphatemia  [ ] Hyperphosphatemia  [ ] Hyponatremia  [ ] Hypernatremia  ------------------------------------------------------------------------  [ ] Acute blood loss anemia  [ ] Post op blood loss anemia  [ ] Iron deficiency anemia  [ ] Anemia due to chronic disease  [ ] Hypercoagulable state  [ ] Leukocytosis  ----------------------------------------------------------------------  [ ] Cerebral infarction  [ ] Transient ischemia attack  [ ] Encephalopathy      Assessment and Plan:   A/P: 78 y/o F with PMHx of HLD, hypercalcemia, Schamberg's, Rosacea, R GSV reflux, known to the vascular surgery service, being admitted for infected right medial malleolar venous ulcer, In the ED, patient afebrile, normotensive, right medial malleolar ulcer measuring about 3 x4 cm, with fibrinous background, non tender, foul smelling (culture sent), bilateral hyperpigmentation on lower extremities. Wound continues to improve with scab forming within wound and still no pus or drainage or foul odor noted.     Vascular:   -BID dressing changes with wet to dry dressings, cleansed w/ hydrogen peroxide  -Wound cx 5/24: Enterobacter and Pseudomonas  -Blood cx: NGTD  -c/w IV Cefepime, plan to d/c with no abx  -c/w home Lipitor   -pain control    HLD:  -c/w lipitor    Diet: DASH  Activity: ambulate as tolerated  DVT PPX: SQH  Dispo: Home PT with rollator

## 2024-06-01 NOTE — PROGRESS NOTE ADULT - SUBJECTIVE AND OBJECTIVE BOX
SUBJECTIVE:  Patient was seen and examined at bedside. Reports feeling at baseline, in nad. Denies and symptoms.     ICU Vital Signs Last 24 Hrs  T(C): 36.4 (01 Jun 2024 08:09), Max: 36.6 (31 May 2024 14:47)  T(F): 97.5 (01 Jun 2024 08:09), Max: 97.8 (31 May 2024 14:47)  HR: 82 (01 Jun 2024 11:30) (77 - 100)  BP: 119/56 (01 Jun 2024 11:30) (115/56 - 149/68)  BP(mean): 81 (01 Jun 2024 11:30) (79 - 91)  ABP: --  ABP(mean): --  RR: 17 (01 Jun 2024 11:30) (16 - 18)  SpO2: 100% (01 Jun 2024 11:30) (92% - 100%)    O2 Parameters below as of 01 Jun 2024 11:30  Patient On (Oxygen Delivery Method): room air          I&O's Summary    31 May 2024 07:01  -  01 Jun 2024 07:00  --------------------------------------------------------  IN: 760 mL / OUT: 2050 mL / NET: -1290 mL    01 Jun 2024 07:01  -  01 Jun 2024 14:22  --------------------------------------------------------  IN: 240 mL / OUT: 0 mL / NET: 240 mL          LABS:                        13.3   8.36  )-----------( 332      ( 01 Jun 2024 07:26 )             41.5     06-01    137  |  102  |  15  ----------------------------<  88  4.0   |  26  |  0.49<L>    Ca    10.3      01 Jun 2024 07:26  Phos  3.1     06-01  Mg     2.1     06-01        Urinalysis Basic - ( 01 Jun 2024 07:26 )    Color: x / Appearance: x / SG: x / pH: x  Gluc: 88 mg/dL / Ketone: x  / Bili: x / Urobili: x   Blood: x / Protein: x / Nitrite: x   Leuk Esterase: x / RBC: x / WBC x   Sq Epi: x / Non Sq Epi: x / Bacteria: x      CAPILLARY BLOOD GLUCOSE            RADIOLOGY & ADDITIONAL TESTS:    Consultant(s) Notes Reviewed:  [x ] YES  [ ] NO    MEDICATIONS  (STANDING):  ascorbic acid 500 milliGRAM(s) Oral daily  atorvastatin 20 milliGRAM(s) Oral at bedtime  cefepime   IVPB 2000 milliGRAM(s) IV Intermittent every 12 hours  heparin   Injectable 5000 Unit(s) SubCutaneous every 12 hours  lactobacillus acidophilus 1 Tablet(s) Oral daily  multivitamin 1 Tablet(s) Oral daily  polyethylene glycol 3350 17 Gram(s) Oral daily  psyllium Powder 1 Packet(s) Oral daily    MEDICATIONS  (PRN):  acetaminophen     Tablet .. 325 milliGRAM(s) Oral every 4 hours PRN Mild Pain (1 - 3)  ondansetron Injectable 4 milliGRAM(s) IV Push every 6 hours PRN Nausea      PHYSICAL EXAM:  General: AOx3, NAD, lying in bed, speaking in full sentences, no labored breathing on RA  HEENT: AT/NC, no facial asymmetry   Lungs: no labored breathing   Heart: regular   Abdomen: soft, no distention, no tenderness, + BS  Extremities:  RLE in ACE wrap,  no edema, no tenderness, no focal deficit      Care Discussed with Consultants/Other Providers [ x] YES  [ ] NO

## 2024-06-02 LAB
ANION GAP SERPL CALC-SCNC: 9 MMOL/L — SIGNIFICANT CHANGE UP (ref 5–17)
BUN SERPL-MCNC: 19 MG/DL — SIGNIFICANT CHANGE UP (ref 7–23)
CALCIUM SERPL-MCNC: 10.1 MG/DL — SIGNIFICANT CHANGE UP (ref 8.4–10.5)
CHLORIDE SERPL-SCNC: 102 MMOL/L — SIGNIFICANT CHANGE UP (ref 96–108)
CO2 SERPL-SCNC: 24 MMOL/L — SIGNIFICANT CHANGE UP (ref 22–31)
CREAT SERPL-MCNC: 0.5 MG/DL — SIGNIFICANT CHANGE UP (ref 0.5–1.3)
EGFR: 96 ML/MIN/1.73M2 — SIGNIFICANT CHANGE UP
GLUCOSE SERPL-MCNC: 88 MG/DL — SIGNIFICANT CHANGE UP (ref 70–99)
HCT VFR BLD CALC: 36.9 % — SIGNIFICANT CHANGE UP (ref 34.5–45)
HGB BLD-MCNC: 12.2 G/DL — SIGNIFICANT CHANGE UP (ref 11.5–15.5)
MAGNESIUM SERPL-MCNC: 2.1 MG/DL — SIGNIFICANT CHANGE UP (ref 1.6–2.6)
MCHC RBC-ENTMCNC: 29.7 PG — SIGNIFICANT CHANGE UP (ref 27–34)
MCHC RBC-ENTMCNC: 33.1 GM/DL — SIGNIFICANT CHANGE UP (ref 32–36)
MCV RBC AUTO: 89.8 FL — SIGNIFICANT CHANGE UP (ref 80–100)
NRBC # BLD: 0 /100 WBCS — SIGNIFICANT CHANGE UP (ref 0–0)
PHOSPHATE SERPL-MCNC: 3.2 MG/DL — SIGNIFICANT CHANGE UP (ref 2.5–4.5)
PLATELET # BLD AUTO: 391 K/UL — SIGNIFICANT CHANGE UP (ref 150–400)
POTASSIUM SERPL-MCNC: 4.1 MMOL/L — SIGNIFICANT CHANGE UP (ref 3.5–5.3)
POTASSIUM SERPL-SCNC: 4.1 MMOL/L — SIGNIFICANT CHANGE UP (ref 3.5–5.3)
RBC # BLD: 4.11 M/UL — SIGNIFICANT CHANGE UP (ref 3.8–5.2)
RBC # FLD: 13.4 % — SIGNIFICANT CHANGE UP (ref 10.3–14.5)
SODIUM SERPL-SCNC: 135 MMOL/L — SIGNIFICANT CHANGE UP (ref 135–145)
WBC # BLD: 8.26 K/UL — SIGNIFICANT CHANGE UP (ref 3.8–10.5)
WBC # FLD AUTO: 8.26 K/UL — SIGNIFICANT CHANGE UP (ref 3.8–10.5)

## 2024-06-02 PROCEDURE — 99232 SBSQ HOSP IP/OBS MODERATE 35: CPT | Mod: GC

## 2024-06-02 RX ORDER — ACETAMINOPHEN 500 MG
325 TABLET ORAL ONCE
Refills: 0 | Status: COMPLETED | OUTPATIENT
Start: 2024-06-02 | End: 2024-06-02

## 2024-06-02 RX ORDER — OXYCODONE HYDROCHLORIDE 5 MG/1
2.5 TABLET ORAL ONCE
Refills: 0 | Status: DISCONTINUED | OUTPATIENT
Start: 2024-06-02 | End: 2024-06-02

## 2024-06-02 RX ADMIN — Medication 1 TABLET(S): at 11:22

## 2024-06-02 RX ADMIN — Medication 325 MILLIGRAM(S): at 04:00

## 2024-06-02 RX ADMIN — CEFEPIME 100 MILLIGRAM(S): 1 INJECTION, POWDER, FOR SOLUTION INTRAMUSCULAR; INTRAVENOUS at 22:24

## 2024-06-02 RX ADMIN — Medication 500 MILLIGRAM(S): at 11:22

## 2024-06-02 RX ADMIN — HEPARIN SODIUM 5000 UNIT(S): 5000 INJECTION INTRAVENOUS; SUBCUTANEOUS at 22:24

## 2024-06-02 RX ADMIN — ATORVASTATIN CALCIUM 20 MILLIGRAM(S): 80 TABLET, FILM COATED ORAL at 22:24

## 2024-06-02 RX ADMIN — Medication 325 MILLIGRAM(S): at 03:00

## 2024-06-02 RX ADMIN — Medication 325 MILLIGRAM(S): at 00:37

## 2024-06-02 RX ADMIN — HEPARIN SODIUM 5000 UNIT(S): 5000 INJECTION INTRAVENOUS; SUBCUTANEOUS at 11:22

## 2024-06-02 RX ADMIN — Medication 325 MILLIGRAM(S): at 01:37

## 2024-06-02 RX ADMIN — CEFEPIME 100 MILLIGRAM(S): 1 INJECTION, POWDER, FOR SOLUTION INTRAMUSCULAR; INTRAVENOUS at 11:22

## 2024-06-02 NOTE — PROGRESS NOTE ADULT - SUBJECTIVE AND OBJECTIVE BOX
ON: 10/10 leg pain, received 2.5 mg oxy. VSS.                      -------------------------------------------------------------------------  PLEASE CHECK WHEN PRESENT:     [  ] Heart Failure     [  ] Acute     [  ] Acute on Chronic     [  ] Chronic  -------------------------------------------------------------------     [  ]Diastolic [HFpEF]     [  ]Systolic [HFrEF]     [  ]Combined [HFpEF & HFrEF]     [  ] afib     [  ] hypertensive heart disease     [  ]Other:  -------------------------------------------------------------------  [ ] Respiratory failure  [ ] Acute cor pulmonale  [ ] Asthma/COPD Exacerbation  [ ] Pleural effusion  [ ] Aspiration pneumonia  -------------------------------------------------------------------  [  ]GARRETT     [  ]ATN     [  ]Reneal Medullary Necrosis     [  ]Renal Cortical Necrosis     [  ]Other Pathological Lesions:    [  ]CKD 1  [  ]CKD 2  [  ]CKD 3  [  ]CKD 4  [  ]CKD 5  [  ]Other  -------------------------------------------------------------------  [  ]Diabetes  [  ] Diabetic PVD Ulcer  [  ] Neuropathic ulcer to DM  [  ] Diabetes with Nephropathy  [  ] Osteomyelitis due to diabetes  [ ] Hyperglycemia  [ ] Hypoglycemia  --------------------------------------------------------------------  [  ]Malnutrition: See Nutrition Note  [  ]Cachexia  [  ]Other:   [  ]Supplement Ordered:  [  ]Morbid Obesity (BMI >=40]  ---------------------------------------------------------------------  [ ] Sepsis/severe sepsis/septic shock  [ ] UTI  [ ] Pneumonia  -----------------------------------------------------------------------  [ ] Acidosis/alkalosis  [ ] Fluid overload  [X ] Hypokalemia  [ ] Hyperkalemia  [ ] Hypomagnesemia  [ ] Hypophosphatemia  [ ] Hyperphosphatemia  [ ] Hyponatremia  [ ] Hypernatremia  ------------------------------------------------------------------------  [ ] Acute blood loss anemia  [ ] Post op blood loss anemia  [ ] Iron deficiency anemia  [ ] Anemia due to chronic disease  [ ] Hypercoagulable state  [ ] Leukocytosis  ----------------------------------------------------------------------  [ ] Cerebral infarction  [ ] Transient ischemia attack  [ ] Encephalopathy      Assessment and Plan:   A/P: 80 y/o F with PMHx of HLD, hypercalcemia, Schamberg's, Rosacea, R GSV reflux, known to the vascular surgery service, being admitted for infected right medial malleolar venous ulcer, In the ED, patient afebrile, normotensive, right medial malleolar ulcer measuring about 3 x4 cm, with fibrinous background, non tender, foul smelling (culture sent), bilateral hyperpigmentation on lower extremities. Wound continues to improve with scab forming within wound and still no pus or drainage or foul odor noted.     Vascular:   -BID dressing changes with wet to dry dressings, cleansed w/ hydrogen peroxide  -Wound cx 5/24: Enterobacter and Pseudomonas  -Blood cx: NGTD  -c/w IV Cefepime, plan to d/c with no abx  -c/w home Lipitor   -pain control    HLD:  -c/w lipitor    Diet: DASH  Activity: ambulate as tolerated  DVT PPX: SQH  Dispo: Home PT with rollator        ON: 10/10 leg pain, received tylenol. VSS.                      -------------------------------------------------------------------------  PLEASE CHECK WHEN PRESENT:     [  ] Heart Failure     [  ] Acute     [  ] Acute on Chronic     [  ] Chronic  -------------------------------------------------------------------     [  ]Diastolic [HFpEF]     [  ]Systolic [HFrEF]     [  ]Combined [HFpEF & HFrEF]     [  ] afib     [  ] hypertensive heart disease     [  ]Other:  -------------------------------------------------------------------  [ ] Respiratory failure  [ ] Acute cor pulmonale  [ ] Asthma/COPD Exacerbation  [ ] Pleural effusion  [ ] Aspiration pneumonia  -------------------------------------------------------------------  [  ]GARRETT     [  ]ATN     [  ]Reneal Medullary Necrosis     [  ]Renal Cortical Necrosis     [  ]Other Pathological Lesions:    [  ]CKD 1  [  ]CKD 2  [  ]CKD 3  [  ]CKD 4  [  ]CKD 5  [  ]Other  -------------------------------------------------------------------  [  ]Diabetes  [  ] Diabetic PVD Ulcer  [  ] Neuropathic ulcer to DM  [  ] Diabetes with Nephropathy  [  ] Osteomyelitis due to diabetes  [ ] Hyperglycemia  [ ] Hypoglycemia  --------------------------------------------------------------------  [  ]Malnutrition: See Nutrition Note  [  ]Cachexia  [  ]Other:   [  ]Supplement Ordered:  [  ]Morbid Obesity (BMI >=40]  ---------------------------------------------------------------------  [ ] Sepsis/severe sepsis/septic shock  [ ] UTI  [ ] Pneumonia  -----------------------------------------------------------------------  [ ] Acidosis/alkalosis  [ ] Fluid overload  [X ] Hypokalemia  [ ] Hyperkalemia  [ ] Hypomagnesemia  [ ] Hypophosphatemia  [ ] Hyperphosphatemia  [ ] Hyponatremia  [ ] Hypernatremia  ------------------------------------------------------------------------  [ ] Acute blood loss anemia  [ ] Post op blood loss anemia  [ ] Iron deficiency anemia  [ ] Anemia due to chronic disease  [ ] Hypercoagulable state  [ ] Leukocytosis  ----------------------------------------------------------------------  [ ] Cerebral infarction  [ ] Transient ischemia attack  [ ] Encephalopathy      Assessment and Plan:   A/P: 78 y/o F with PMHx of HLD, hypercalcemia, Schamberg's, Rosacea, R GSV reflux, known to the vascular surgery service, being admitted for infected right medial malleolar venous ulcer, In the ED, patient afebrile, normotensive, right medial malleolar ulcer measuring about 3 x4 cm, with fibrinous background, non tender, foul smelling (culture sent), bilateral hyperpigmentation on lower extremities. Wound continues to improve with scab forming within wound and still no pus or drainage or foul odor noted.     Vascular:   -BID dressing changes with wet to dry dressings, cleansed w/ hydrogen peroxide  -Wound cx 5/24: Enterobacter and Pseudomonas  -Blood cx: NGTD  -c/w IV Cefepime, plan to d/c with no abx  -c/w home Lipitor   -pain control    HLD:  -c/w lipitor    Diet: DASH  Activity: ambulate as tolerated  DVT PPX: SQH  Dispo: Home PT with rollator        ON: Leg pain, improved w/ tylenol.     S: States she feels well. Pain of leg improved w/ 325mg tylenol x 2. Has been getting out of bed. Voiding. Moving her bowels.     O: AFVSS    Physical Exam:  General: no acute distress  Pulmonary: no accessory muscle use   Cardiovascular: NSR  Abdominal: soft, NT  Extremities: R foot- medial malleolar venous ulcer with red wound bed with some fibrinous exudate. no crepitus, no drainage, no foul odor, varicose and spider veins noted on b/l LE.       LABS:  cret                        12.2   8.26  )-----------( 391      ( 02 Jun 2024 05:30 )             36.9     06-02    135  |  102  |  19  ----------------------------<  88  4.1   |  24  |  0.50    Ca    10.1      02 Jun 2024 05:30  Phos  3.2     06-02  Mg     2.1     06-02    ----------------------------------------------------------------------  PLEASE CHECK WHEN PRESENT:     [  ] Heart Failure     [  ] Acute     [  ] Acute on Chronic     [  ] Chronic  -------------------------------------------------------------------     [  ]Diastolic [HFpEF]     [  ]Systolic [HFrEF]     [  ]Combined [HFpEF & HFrEF]     [  ] afib     [  ] hypertensive heart disease     [  ]Other:  -------------------------------------------------------------------  [ ] Respiratory failure  [ ] Acute cor pulmonale  [ ] Asthma/COPD Exacerbation  [ ] Pleural effusion  [ ] Aspiration pneumonia  -------------------------------------------------------------------  [  ]GARRTET     [  ]ATN     [  ]Reneal Medullary Necrosis     [  ]Renal Cortical Necrosis     [  ]Other Pathological Lesions:    [  ]CKD 1  [  ]CKD 2  [  ]CKD 3  [  ]CKD 4  [  ]CKD 5  [  ]Other  -------------------------------------------------------------------  [  ]Diabetes  [  ] Diabetic PVD Ulcer  [  ] Neuropathic ulcer to DM  [  ] Diabetes with Nephropathy  [  ] Osteomyelitis due to diabetes  [ ] Hyperglycemia  [ ] Hypoglycemia  --------------------------------------------------------------------  [  ]Malnutrition: See Nutrition Note  [  ]Cachexia  [  ]Other:   [  ]Supplement Ordered:  [  ]Morbid Obesity (BMI >=40]  ---------------------------------------------------------------------  [ ] Sepsis/severe sepsis/septic shock  [ ] UTI  [ ] Pneumonia  -----------------------------------------------------------------------  [ ] Acidosis/alkalosis  [ ] Fluid overload  [X ] Hypokalemia  [ ] Hyperkalemia  [ ] Hypomagnesemia  [ ] Hypophosphatemia  [ ] Hyperphosphatemia  [ ] Hyponatremia  [ ] Hypernatremia  ------------------------------------------------------------------------  [ ] Acute blood loss anemia  [ ] Post op blood loss anemia  [ ] Iron deficiency anemia  [ ] Anemia due to chronic disease  [ ] Hypercoagulable state  [ ] Leukocytosis  ----------------------------------------------------------------------  [ ] Cerebral infarction  [ ] Transient ischemia attack  [ ] Encephalopathy      Assessment and Plan:   A/P: 78 y/o F with PMHx of HLD, hypercalcemia, Schamberg's, Rosacea, R GSV reflux, known to the vascular surgery service, being admitted for infected right medial malleolar venous ulcer, In the ED, patient afebrile, normotensive, right medial malleolar ulcer measuring about 3 x4 cm, with fibrinous background, non tender, foul smelling (culture sent), bilateral hyperpigmentation on lower extremities. Wound continues to improve with scab forming within wound and still no pus or drainage or foul odor noted.     Vascular:   -BID dressing changes with wet to dry dressings, cleansed w/ hydrogen peroxide  -Wound cx 5/24: Enterobacter and Pseudomonas  -Blood cx: NGTD  -c/w IV Cefepime, plan to d/c with no abx  -c/w home Lipitor   -pain control    HLD:  -c/w lipitor    Diet: DASH  Activity: ambulate as tolerated  DVT PPX: SQH  Dispo: Home PT with rollator

## 2024-06-02 NOTE — PROGRESS NOTE ADULT - SUBJECTIVE AND OBJECTIVE BOX
SUBJECTIVE:  Patient was seen and examined at bedside. Reports feeling at baseline, in nad. Denies any symptoms.       ICU Vital Signs Last 24 Hrs  T(C): 36.9 (02 Jun 2024 09:01), Max: 36.9 (02 Jun 2024 09:01)  T(F): 98.4 (02 Jun 2024 09:01), Max: 98.4 (02 Jun 2024 09:01)  HR: 84 (02 Jun 2024 09:01) (68 - 91)  BP: 122/58 (02 Jun 2024 09:01) (109/55 - 123/62)  BP(mean): 80 (02 Jun 2024 07:19) (74 - 82)  ABP: --  ABP(mean): --  RR: 16 (02 Jun 2024 09:01) (16 - 18)  SpO2: 100% (02 Jun 2024 09:01) (97% - 100%)    O2 Parameters below as of 02 Jun 2024 09:01  Patient On (Oxygen Delivery Method): room air          I&O's Summary    01 Jun 2024 07:01  -  02 Jun 2024 07:00  --------------------------------------------------------  IN: 710 mL / OUT: 2200 mL / NET: -1490 mL    02 Jun 2024 07:01  -  02 Jun 2024 13:41  --------------------------------------------------------  IN: 180 mL / OUT: 0 mL / NET: 180 mL          LABS:                        12.2   8.26  )-----------( 391      ( 02 Jun 2024 05:30 )             36.9     06-02    135  |  102  |  19  ----------------------------<  88  4.1   |  24  |  0.50    Ca    10.1      02 Jun 2024 05:30  Phos  3.2     06-02  Mg     2.1     06-02        Urinalysis Basic - ( 02 Jun 2024 05:30 )    Color: x / Appearance: x / SG: x / pH: x  Gluc: 88 mg/dL / Ketone: x  / Bili: x / Urobili: x   Blood: x / Protein: x / Nitrite: x   Leuk Esterase: x / RBC: x / WBC x   Sq Epi: x / Non Sq Epi: x / Bacteria: x      CAPILLARY BLOOD GLUCOSE            RADIOLOGY & ADDITIONAL TESTS:    Consultant(s) Notes Reviewed:  [x ] YES  [ ] NO    MEDICATIONS  (STANDING):  ascorbic acid 500 milliGRAM(s) Oral daily  atorvastatin 20 milliGRAM(s) Oral at bedtime  cefepime   IVPB 2000 milliGRAM(s) IV Intermittent every 12 hours  heparin   Injectable 5000 Unit(s) SubCutaneous every 12 hours  lactobacillus acidophilus 1 Tablet(s) Oral daily  multivitamin 1 Tablet(s) Oral daily  polyethylene glycol 3350 17 Gram(s) Oral daily  psyllium Powder 1 Packet(s) Oral daily    MEDICATIONS  (PRN):  acetaminophen     Tablet .. 325 milliGRAM(s) Oral every 4 hours PRN Mild Pain (1 - 3)  ondansetron Injectable 4 milliGRAM(s) IV Push every 6 hours PRN Nausea      PHYSICAL EXAM:  General: AOx3, NAD, lying in bed, speaking in full sentences, no labored breathing on RA  HEENT: AT/NC, no facial asymmetry   Lungs: no labored breathing   Heart: regular   Abdomen: soft, no distention, no tenderness, + BS  Extremities:  RLE in ACE wrap,  no edema, no tenderness, no focal deficit      Care Discussed with Consultants/Other Providers [ x] YES  [ ] NO

## 2024-06-03 ENCOUNTER — TRANSCRIPTION ENCOUNTER (OUTPATIENT)
Age: 79
End: 2024-06-03

## 2024-06-03 VITALS
OXYGEN SATURATION: 97 % | HEART RATE: 84 BPM | DIASTOLIC BLOOD PRESSURE: 60 MMHG | RESPIRATION RATE: 18 BRPM | SYSTOLIC BLOOD PRESSURE: 122 MMHG | TEMPERATURE: 98 F

## 2024-06-03 LAB
ANION GAP SERPL CALC-SCNC: 10 MMOL/L — SIGNIFICANT CHANGE UP (ref 5–17)
BUN SERPL-MCNC: 20 MG/DL — SIGNIFICANT CHANGE UP (ref 7–23)
CALCIUM SERPL-MCNC: 10.3 MG/DL — SIGNIFICANT CHANGE UP (ref 8.4–10.5)
CHLORIDE SERPL-SCNC: 100 MMOL/L — SIGNIFICANT CHANGE UP (ref 96–108)
CO2 SERPL-SCNC: 25 MMOL/L — SIGNIFICANT CHANGE UP (ref 22–31)
CREAT SERPL-MCNC: 0.52 MG/DL — SIGNIFICANT CHANGE UP (ref 0.5–1.3)
EGFR: 95 ML/MIN/1.73M2 — SIGNIFICANT CHANGE UP
GLUCOSE SERPL-MCNC: 94 MG/DL — SIGNIFICANT CHANGE UP (ref 70–99)
HCT VFR BLD CALC: 36.9 % — SIGNIFICANT CHANGE UP (ref 34.5–45)
HGB BLD-MCNC: 12.4 G/DL — SIGNIFICANT CHANGE UP (ref 11.5–15.5)
MAGNESIUM SERPL-MCNC: 2.3 MG/DL — SIGNIFICANT CHANGE UP (ref 1.6–2.6)
MCHC RBC-ENTMCNC: 30.1 PG — SIGNIFICANT CHANGE UP (ref 27–34)
MCHC RBC-ENTMCNC: 33.6 GM/DL — SIGNIFICANT CHANGE UP (ref 32–36)
MCV RBC AUTO: 89.6 FL — SIGNIFICANT CHANGE UP (ref 80–100)
NRBC # BLD: 0 /100 WBCS — SIGNIFICANT CHANGE UP (ref 0–0)
PHOSPHATE SERPL-MCNC: 3.1 MG/DL — SIGNIFICANT CHANGE UP (ref 2.5–4.5)
PLATELET # BLD AUTO: 384 K/UL — SIGNIFICANT CHANGE UP (ref 150–400)
POTASSIUM SERPL-MCNC: 4.1 MMOL/L — SIGNIFICANT CHANGE UP (ref 3.5–5.3)
POTASSIUM SERPL-SCNC: 4.1 MMOL/L — SIGNIFICANT CHANGE UP (ref 3.5–5.3)
RBC # BLD: 4.12 M/UL — SIGNIFICANT CHANGE UP (ref 3.8–5.2)
RBC # FLD: 13.4 % — SIGNIFICANT CHANGE UP (ref 10.3–14.5)
SODIUM SERPL-SCNC: 135 MMOL/L — SIGNIFICANT CHANGE UP (ref 135–145)
WBC # BLD: 8.39 K/UL — SIGNIFICANT CHANGE UP (ref 3.8–10.5)
WBC # FLD AUTO: 8.39 K/UL — SIGNIFICANT CHANGE UP (ref 3.8–10.5)

## 2024-06-03 PROCEDURE — 87186 SC STD MICRODIL/AGAR DIL: CPT

## 2024-06-03 PROCEDURE — 87040 BLOOD CULTURE FOR BACTERIA: CPT

## 2024-06-03 PROCEDURE — 85025 COMPLETE CBC W/AUTO DIFF WBC: CPT

## 2024-06-03 PROCEDURE — 80048 BASIC METABOLIC PNL TOTAL CA: CPT

## 2024-06-03 PROCEDURE — 80202 ASSAY OF VANCOMYCIN: CPT

## 2024-06-03 PROCEDURE — 84132 ASSAY OF SERUM POTASSIUM: CPT

## 2024-06-03 PROCEDURE — 97116 GAIT TRAINING THERAPY: CPT

## 2024-06-03 PROCEDURE — 84100 ASSAY OF PHOSPHORUS: CPT

## 2024-06-03 PROCEDURE — 85652 RBC SED RATE AUTOMATED: CPT

## 2024-06-03 PROCEDURE — 36415 COLL VENOUS BLD VENIPUNCTURE: CPT

## 2024-06-03 PROCEDURE — 80061 LIPID PANEL: CPT

## 2024-06-03 PROCEDURE — 83735 ASSAY OF MAGNESIUM: CPT

## 2024-06-03 PROCEDURE — 71045 X-RAY EXAM CHEST 1 VIEW: CPT

## 2024-06-03 PROCEDURE — 99285 EMERGENCY DEPT VISIT HI MDM: CPT

## 2024-06-03 PROCEDURE — 99238 HOSP IP/OBS DSCHRG MGMT 30/<: CPT

## 2024-06-03 PROCEDURE — 86140 C-REACTIVE PROTEIN: CPT

## 2024-06-03 PROCEDURE — 87070 CULTURE OTHR SPECIMN AEROBIC: CPT

## 2024-06-03 PROCEDURE — 97530 THERAPEUTIC ACTIVITIES: CPT

## 2024-06-03 PROCEDURE — 99232 SBSQ HOSP IP/OBS MODERATE 35: CPT

## 2024-06-03 PROCEDURE — 87077 CULTURE AEROBIC IDENTIFY: CPT

## 2024-06-03 PROCEDURE — 83605 ASSAY OF LACTIC ACID: CPT

## 2024-06-03 PROCEDURE — 97161 PT EVAL LOW COMPLEX 20 MIN: CPT

## 2024-06-03 PROCEDURE — 85027 COMPLETE CBC AUTOMATED: CPT

## 2024-06-03 RX ORDER — ATORVASTATIN CALCIUM 80 MG/1
1 TABLET, FILM COATED ORAL
Qty: 0 | Refills: 0 | DISCHARGE

## 2024-06-03 RX ADMIN — Medication 1 TABLET(S): at 12:00

## 2024-06-03 RX ADMIN — Medication 1 TABLET(S): at 11:59

## 2024-06-03 RX ADMIN — HEPARIN SODIUM 5000 UNIT(S): 5000 INJECTION INTRAVENOUS; SUBCUTANEOUS at 09:14

## 2024-06-03 RX ADMIN — CEFEPIME 100 MILLIGRAM(S): 1 INJECTION, POWDER, FOR SOLUTION INTRAMUSCULAR; INTRAVENOUS at 09:14

## 2024-06-03 RX ADMIN — Medication 500 MILLIGRAM(S): at 12:00

## 2024-06-03 NOTE — PROGRESS NOTE ADULT - PROVIDER SPECIALTY LIST ADULT
Internal Medicine
Vascular Surgery
Vascular Surgery
Internal Medicine
Vascular Surgery
Internal Medicine
Vascular Surgery
Vascular Surgery
Internal Medicine
Vascular Surgery

## 2024-06-03 NOTE — PROGRESS NOTE ADULT - REASON FOR ADMISSION
Infected medial malleolus venous ulcer
Azelaic Acid Counseling: Patient counseled that medicine may cause skin irritation and to avoid applying near the eyes.  In the event of skin irritation, the patient was advised to reduce the amount of the drug applied or use it less frequently.   The patient verbalized understanding of the proper use and possible adverse effects of azelaic acid.  All of the patient's questions and concerns were addressed.

## 2024-06-03 NOTE — PROGRESS NOTE ADULT - SUBJECTIVE AND OBJECTIVE BOX
O/N: MALLORIE  6/2: no events      S: Patient does not have any complaints    O: Examined in bed resting comfortably     ROS: Denies headache, blurred vision, chest pain, SOB, abdominal pain, nausea or vomiting.         cefepime   IVPB 2000  cefepime   IVPB 2000  heparin   Injectable 5000      Allergies    latex (Rash)  minocycline (Rash)    Intolerances        Vital Signs Last 24 Hrs  T(C): 36.5 (03 Jun 2024 05:01), Max: 36.9 (02 Jun 2024 09:01)  T(F): 97.7 (03 Jun 2024 05:01), Max: 98.4 (02 Jun 2024 09:01)  HR: 75 (03 Jun 2024 05:01) (68 - 98)  BP: 125/57 (03 Jun 2024 05:01) (116/56 - 126/60)  BP(mean): 80 (02 Jun 2024 07:19) (80 - 80)  RR: 16 (03 Jun 2024 05:01) (16 - 18)  SpO2: 99% (03 Jun 2024 05:01) (95% - 100%)    Parameters below as of 03 Jun 2024 05:01  Patient On (Oxygen Delivery Method): room air      I&O's Summary    02 Jun 2024 07:01  -  03 Jun 2024 07:00  --------------------------------------------------------  IN: 800 mL / OUT: 1500 mL / NET: -700 mL        Physical Exam:  General: alert and awake, NAD  Pulmonary: no respiratory distress  Cardiovascular: RRR  Abdominal: soft  Extremities:   ----------------------------------------------------------------------  PLEASE CHECK WHEN PRESENT:     [  ] Heart Failure     [  ] Acute     [  ] Acute on Chronic     [  ] Chronic  -------------------------------------------------------------------     [  ]Diastolic [HFpEF]     [  ]Systolic [HFrEF]     [  ]Combined [HFpEF & HFrEF]     [  ] afib     [  ] hypertensive heart disease     [  ]Other:  -------------------------------------------------------------------  [ ] Respiratory failure  [ ] Acute cor pulmonale  [ ] Asthma/COPD Exacerbation  [ ] Pleural effusion  [ ] Aspiration pneumonia  -------------------------------------------------------------------  [  ]GARRETT     [  ]ATN     [  ]Reneal Medullary Necrosis     [  ]Renal Cortical Necrosis     [  ]Other Pathological Lesions:    [  ]CKD 1  [  ]CKD 2  [  ]CKD 3  [  ]CKD 4  [  ]CKD 5  [  ]Other  -------------------------------------------------------------------  [  ]Diabetes  [  ] Diabetic PVD Ulcer  [  ] Neuropathic ulcer to DM  [  ] Diabetes with Nephropathy  [  ] Osteomyelitis due to diabetes  [ ] Hyperglycemia  [ ] Hypoglycemia  --------------------------------------------------------------------  [  ]Malnutrition: See Nutrition Note  [  ]Cachexia  [  ]Other:   [  ]Supplement Ordered:  [  ]Morbid Obesity (BMI >=40]  ---------------------------------------------------------------------  [ ] Sepsis/severe sepsis/septic shock  [ ] UTI  [ ] Pneumonia  -----------------------------------------------------------------------  [ ] Acidosis/alkalosis  [ ] Fluid overload  [X ] Hypokalemia  [ ] Hyperkalemia  [ ] Hypomagnesemia  [ ] Hypophosphatemia  [ ] Hyperphosphatemia  [ ] Hyponatremia  [ ] Hypernatremia  ------------------------------------------------------------------------  [ ] Acute blood loss anemia  [ ] Post op blood loss anemia  [ ] Iron deficiency anemia  [ ] Anemia due to chronic disease  [ ] Hypercoagulable state  [ ] Leukocytosis  ----------------------------------------------------------------------  [ ] Cerebral infarction  [ ] Transient ischemia attack  [ ] Encephalopathy        LABS:                        12.4   8.39  )-----------( 384      ( 03 Jun 2024 06:52 )             36.9     06-03    135  |  100  |  20  ----------------------------<  94  4.1   |  25  |  0.52    Ca    10.3      03 Jun 2024 06:52  Phos  3.1     06-03  Mg     2.3     06-03            Assessment and Plan:   A/P: 80 y/o F with PMHx of HLD, hypercalcemia, Schamberg's, Rosacea, R GSV reflux, known to the vascular surgery service, being admitted for infected right medial malleolar venous ulcer, In the ED, patient afebrile, normotensive, right medial malleolar ulcer measuring about 3 x4 cm, with fibrinous background, non tender, foul smelling (culture sent), bilateral hyperpigmentation on lower extremities. Wound continues to improve with scab forming within wound and still no pus or drainage or foul odor noted.     Vascular:   -BID dressing changes with wet to dry dressings, cleansed w/ hydrogen peroxide  -Wound cx 5/24: Enterobacter and Pseudomonas  -Blood cx: NGTD  -c/w IV Cefepime, plan to d/c with no abx  -c/w home Lipitor   -pain control    HLD:  -c/w lipitor    Diet: DASH  Activity: ambulate as tolerated  DVT PPX: SQH  Dispo: d/c Home with VNS,  home PT & rollator     I have personally seen and examined the patient. I have reviewed all pertinent imaging and laboratory findings.

## 2024-06-03 NOTE — PROGRESS NOTE ADULT - ASSESSMENT
80 y/o F with PMHx of HLD, hypercalcemia, Schamberg's, Rosacea, R GSV reflux, bilateral asymptomatic varicose and spider veins, presenting with infected right medial malleolar venous ulcer. Medicine following for comanagement.     #Right lower extremity ulcer   - Would care per vascular surgery   - IV abx per primary team, wound culture growing pseudomonas and enterobacter. Switched back to Vanc/Pip-Tazo   - Remainder of management per primary team    #Hyponatremia  Possibly some degree of hypovolemia. Follow-up repeat CHEM  Will manage based on trend    #HLD   - Continue lipitor    DVT ppx: SQH  Discussed with primary team     
78 y/o F with PMHx of HLD, hypercalcemia, Schamberg's, Rosacea, R GSV reflux, bilateral asymptomatic varicose and spider veins, presenting with infected right medial malleolar venous ulcer. Medicine following for comanagement.     #Right lower extremity ulcer   - Would care per vascular surgery   - IV abx per primary team, wound culture growing pseudomonas and enterobacter. Patient started on Cefepime until 6/3.     - Remainder of management per primary team    #Hyponatremia  Resolved     #HLD   - Continue lipitor    DVT ppx: SQH  Discussed with primary team     
78 y/o F with PMHx of HLD, hypercalcemia, Schamberg's, Rosacea, R GSV reflux, bilateral asymptomatic varicose and spider veins, presenting with infected right medial malleolar venous ulcer. Medicine following for comanagement.     #Right lower extremity ulcer   - Would care per vascular surgery   - IV abx per primary team, wound culture growing pseudomonas and enterobacter. Switched back to Vanc/Pip-Tazo. Will defer to Primary team and Stewardship    - Remainder of management per primary team    #Hyponatremia  Resolved     #HLD   - Continue lipitor    DVT ppx: SQH  Discussed with primary team     
80 y/o F with PMHx of HLD, hypercalcemia, Schamberg's, Rosacea, R GSV reflux, bilateral asymptomatic varicose and spider veins, presenting with infected right medial malleolar venous ulcer. Medicine following for comanagement.     #Right lower extremity ulcer   - Would care per vascular surgery   - Continue IV Vanc/Zosyn, follow up blood and wound cultures, would recommend transitioning to PO abx in 24-48 hours if clinical improvement   - Remainder of management per primary team    #HLD   - Continue lipitor   - Obtain lipid profile in AM    Dispo: pending PT and clinical improvement  
80 y/o F with PMHx of HLD, hypercalcemia, Schamberg's, Rosacea, R GSV reflux, bilateral asymptomatic varicose and spider veins, presenting with infected right medial malleolar venous ulcer. Medicine following for comanagement.     #Right lower extremity ulcer   - Would care per vascular surgery   - IV abx per primary team, wound culture growing pseudomonas and enterobacter   - Remainder of management per primary team    #HLD   - Continue lipitor      Dispo: pending PT and clinical improvement  
80 y/o F with PMHx of HLD, hypercalcemia, Schamberg's, Rosacea, R GSV reflux, bilateral asymptomatic varicose and spider veins, presenting with infected right medial malleolar venous ulcer. Medicine following for comanagement.     #Right lower extremity ulcer   - Would care per vascular surgery   - IV abx per primary team, wound culture growing pseudomonas and enterobacter. Patient started on Cefepime until 6/2.     - Remainder of management per primary team    #Hyponatremia  Resolved     #HLD   - Continue lipitor    DVT ppx: SQH  Discussed with primary team 
80 y/o F with PMHx of HLD, hypercalcemia, Schamberg's, Rosacea, R GSV reflux, known to the vascular surgery service, being admitted for infected right medial malleolar venous ulcer, In the ED, patient afebrile, normotensive, right medial malleolar ulcer measuring about 3 x4 cm, with fibrinous background, non tender, foul smelling (culture sent), bilateral hyperpigmentation on lower extremities.    TID dressing changes with Jose Maria's solutions  Wound culture sent on 5/24 follow results   Zosyn and Vancomycin IV will follow vanc through   Continue Home Lipitor   AM labs 
A/P:  78 y/o F with PMHx of HLD, hypercalcemia, Schamberg's, Rosacea, R GSV reflux, known to the vascular surgery service, being admitted for infected right medial malleolar venous ulcer, In the ED, patient afebrile, normotensive, right medial malleolar ulcer measuring about 3 x4 cm, with fibrinous background, non tender, foul smelling (culture sent), bilateral hyperpigmentation on lower extremities.    TID dressing changes with Jose Maria's solutions  Wound culture sent on 5/24 follow results   Zosyn and Vancomycin IV will follow vanc through   Continue Home Lipitor   AM labs 
78 y/o F with PMHx of HLD, hypercalcemia, Schamberg's, Rosacea, R GSV reflux, bilateral asymptomatic varicose and spider veins, presenting with infected right medial malleolar venous ulcer. Medicine following for comanagement.     #Right lower extremity ulcer   - Would care per vascular surgery   - IV abx per primary team, wound culture growing pseudomonas and enterobacter. Patient started on Cefepime until 6/2.     - Remainder of management per primary team    #Hyponatremia  Resolved     #HLD   - Continue lipitor    DVT ppx: SQH  Discussed with primary team 
78 y/o F with PMHx of HLD, hypercalcemia, Schamberg's, Rosacea, R GSV reflux, bilateral asymptomatic varicose and spider veins, presenting with infected right medial malleolar venous ulcer. Medicine following for comanagement.     #Right lower extremity ulcer   - Would care per vascular surgery   - IV abx per primary team, wound culture growing pseudomonas and enterobacter. Patient started on Cefepime.    - Remainder of management per primary team    #Hyponatremia  Resolved     #HLD   - Continue lipitor    DVT ppx: SQH  Discussed with primary team       
78 y/o F with PMHx of HLD, hypercalcemia, Schamberg's, Rosacea, R GSV reflux, known to the vascular surgery service, being admitted for infected right medial malleolar venous ulcer, In the ED, patient afebrile, normotensive, right medial malleolar ulcer measuring about 3 x4 cm, with fibrinous background, non tender, foul smelling (culture sent), bilateral hyperpigmentation on lower extremities.    BID dressing changes with wet to dry dressings  Wound culture sent on 5/24: Enterobacter and Pseudomonas  Zosyn and Vancomycin IV will follow vanc through at 6am on 5/28  Continue Home Lipitor   AM labs   
80 y/o F with PMHx of HLD, hypercalcemia, Schamberg's, Rosacea, R GSV reflux, bilateral asymptomatic varicose and spider veins, presenting with infected right medial malleolar venous ulcer. Medicine following for comanagement.     #Right lower extremity ulcer   - Would care per vascular surgery   - IV abx per primary team, wound culture growing pseudomonas and enterobacter. Patient started on Cefepime, monitor BMs, bicarb noted, no AG. Continue to monitor    - Remainder of management per primary team    #Hyponatremia  Resolved     #HLD   - Continue lipitor    DVT ppx: SQH  Discussed with primary team       
A/P: 78 y/o F with PMHx of HLD, hypercalcemia, Schamberg's, Rosacea, R GSV reflux, known to the vascular surgery service, being admitted for infected right medial malleolar venous ulcer, In the ED, patient afebrile, normotensive, right medial malleolar ulcer measuring about 3 x4 cm, with fibrinous background, non tender, foul smelling (culture sent), bilateral hyperpigmentation on lower extremities. Wound continues to improve with scab forming within wound and still no pus or drainage or foul odor noted.     Vascular:   -BID dressing changes with wet to dry dressings  -Wound cx 5/24: Enterobacter and Pseudomonas  -Blood cx: NGTD  -c/w IV Cefepime, plan to d/c with PO abx cipro 500q 12  -c/w home Lipitor   -pain control    HLD:  -c/w lipitor    Diet: DASH  Activity: ambulate as tolerated  DVT PPX: SQH  Dispo: Home PT with rollator     Mahamed Frnaco PATonaC  Vascular Surgery

## 2024-06-03 NOTE — PROGRESS NOTE ADULT - SUBJECTIVE AND OBJECTIVE BOX
SUBJECTIVE:  Patient was seen and examined at bedside. Feeling fine, denies complaints. Plan for DC today, Telemetry reviewed.     Review of systems: No fever, chills, dizziness, HA, Changes in vision, CP, dyspnea, nausea or vomiting, dysuria, changes in bowel movements, LE edema. Rest of 12 point Review of systems negative unless otherwise documented elsewhere in note.     Diet, Regular:   DASH/TLC Sodium & Cholesterol Restricted (DASH) (05-24-24 @ 15:17) [Active]      MEDICATIONS:  MEDICATIONS  (STANDING):  ascorbic acid 500 milliGRAM(s) Oral daily  atorvastatin 20 milliGRAM(s) Oral at bedtime  cefepime   IVPB 2000 milliGRAM(s) IV Intermittent every 12 hours  heparin   Injectable 5000 Unit(s) SubCutaneous every 12 hours  lactobacillus acidophilus 1 Tablet(s) Oral daily  multivitamin 1 Tablet(s) Oral daily  polyethylene glycol 3350 17 Gram(s) Oral daily  psyllium Powder 1 Packet(s) Oral daily    MEDICATIONS  (PRN):  acetaminophen     Tablet .. 325 milliGRAM(s) Oral every 4 hours PRN Mild Pain (1 - 3)  ondansetron Injectable 4 milliGRAM(s) IV Push every 6 hours PRN Nausea      Allergies    latex (Rash)  minocycline (Rash)    Intolerances        OBJECTIVE:  Vital Signs Last 24 Hrs  T(C): 36.6 (03 Jun 2024 09:00), Max: 36.8 (03 Jun 2024 00:02)  T(F): 97.9 (03 Jun 2024 09:00), Max: 98.2 (03 Jun 2024 00:02)  HR: 84 (03 Jun 2024 09:00) (75 - 98)  BP: 122/60 (03 Jun 2024 09:00) (116/56 - 126/60)  BP(mean): 86 (03 Jun 2024 09:00) (86 - 86)  RR: 18 (03 Jun 2024 09:00) (16 - 18)  SpO2: 97% (03 Jun 2024 09:00) (95% - 99%)    Parameters below as of 03 Jun 2024 09:00  Patient On (Oxygen Delivery Method): room air      I&O's Summary    02 Jun 2024 07:01  -  03 Jun 2024 07:00  --------------------------------------------------------  IN: 800 mL / OUT: 1500 mL / NET: -700 mL    03 Jun 2024 07:01  -  03 Jun 2024 11:10  --------------------------------------------------------  IN: 240 mL / OUT: 200 mL / NET: 40 mL        PHYSICAL EXAM:  General: AOX3, NAD, lying in bed, speaking in full sentences  Lungs: no labored breathing  Extremities: chronic skin change, right foot in dressing, no edema, no focal deficit   Neuro: AOx3, speaking in full sentences  Psych: affect and behavior appropriate, pleasant at time of interview    LABS:                        12.4   8.39  )-----------( 384      ( 03 Jun 2024 06:52 )             36.9     06-03    135  |  100  |  20  ----------------------------<  94  4.1   |  25  |  0.52    Ca    10.3      03 Jun 2024 06:52  Phos  3.1     06-03  Mg     2.3     06-03          CAPILLARY BLOOD GLUCOSE        Urinalysis Basic - ( 03 Jun 2024 06:52 )    Color: x / Appearance: x / SG: x / pH: x  Gluc: 94 mg/dL / Ketone: x  / Bili: x / Urobili: x   Blood: x / Protein: x / Nitrite: x   Leuk Esterase: x / RBC: x / WBC x   Sq Epi: x / Non Sq Epi: x / Bacteria: x        MICRODATA:      RADIOLOGY/OTHER STUDIES:

## 2024-06-03 NOTE — DISCHARGE NOTE NURSING/CASE MANAGEMENT/SOCIAL WORK - PATIENT PORTAL LINK FT
You can access the FollowMyHealth Patient Portal offered by [facility] by registering at the following website: [email or web address]. By joining [facility]’s FollowMyHealth portal, you will also be able to view your health information using other applications (apps) compatible with our system.

## 2024-06-04 PROBLEM — L71.9 ROSACEA, UNSPECIFIED: Chronic | Status: ACTIVE | Noted: 2024-05-24

## 2024-06-04 PROBLEM — I87.2 VENOUS INSUFFICIENCY (CHRONIC) (PERIPHERAL): Chronic | Status: ACTIVE | Noted: 2024-05-24

## 2024-06-04 PROBLEM — E78.5 HYPERLIPIDEMIA, UNSPECIFIED: Chronic | Status: ACTIVE | Noted: 2024-05-24

## 2024-06-05 ENCOUNTER — APPOINTMENT (OUTPATIENT)
Dept: VASCULAR SURGERY | Facility: CLINIC | Age: 79
End: 2024-06-05

## 2024-06-05 VITALS
HEIGHT: 64.5 IN | SYSTOLIC BLOOD PRESSURE: 138 MMHG | DIASTOLIC BLOOD PRESSURE: 72 MMHG | BODY MASS INDEX: 18.55 KG/M2 | HEART RATE: 105 BPM | WEIGHT: 110 LBS

## 2024-06-05 PROCEDURE — 99213 OFFICE O/P EST LOW 20 MIN: CPT

## 2024-06-06 NOTE — REASON FOR VISIT
[Follow-Up: _____] : a [unfilled] follow-up visit [Post Hospitalization] : a post hospitalization visit [FreeTextEntry1] : R ankle venous ulcer, cellulitis

## 2024-06-06 NOTE — ASSESSMENT
[Arterial/Venous Disease] : arterial/venous disease [Medication Management] : medication management [Foot care/Footwear] : foot care/footwear [Ulcer Care] : ulcer care [FreeTextEntry1] : 78 y/o F w/ R GSV reflux, bilt asymptomatic varicose and spider veins, R foot ulcer with chronic bilateral edema and chronic venous stasis. New R medial ankle large venous ulcer with mild signs of cellulitis and stasis dermatitis s/p admission 5/24-6/3.  On exam, scattered spider veins on both LEs and RLE with mildly bulging varicose vein on the distal calf. Skin on both calves with mild hyperpigmentation changes c/w venous stasis discoloration and on the right side stasis dermatitis. Bilt mild edema, improved on the right calf. Superficial wound on R medial ankle, irregular borders and base with mixed granulation and small film of fibrinous tissue, mild serosang drainage on dressing, approx 2x2cm. Surrounding erythema with mild improvement however skin scaly and dry.  Wound care provided.  Pt recommended to rest with the legs elevated above the chest level, limit standing or standing, and may continue daily walking. She was advised to continue to use peroxide to clean her wound and moisturize the skin daily, will review ordered with visiting nurse. Pt to return next week.

## 2024-06-06 NOTE — PHYSICAL EXAM
[Respiratory Effort] : normal respiratory effort [Ankle Swelling (On Exam)] : present [Ankle Swelling Bilaterally] : bilaterally  [Varicose Veins Of Lower Extremities] : present [Varicose Veins Of The Right Leg] : of the right leg [Ankle Swelling On The Right] : mild [] : bilaterally [Ankle Swelling On The Left] : moderate [Alert] : alert [Oriented to Person] : oriented to person [Oriented to Place] : oriented to place [Oriented to Time] : oriented to time [Calm] : calm [de-identified] : WN/WD [FreeTextEntry1] : Scattered spider veins on both LEs and RLE with mildly bulging varicose vein on the distal calf. Skin on both calves with mild hyperpigmentation changes c/w venous stasis discoloration and on the right side stasis dermatitis. Bilt mild edema, improved on the right calf. Superficial wound on R medial ankle, irregular borders and base with mixed granulation and small film of fibrinous tissue, mild serosang drainage on dressing, approx 2x2cm. Surrounding erythema with mild improvement however skin scaly and dry. [de-identified] : FROM [de-identified] : See cardiovascular section

## 2024-06-06 NOTE — ADDENDUM
[FreeTextEntry1] : I, Dr. Gee Saldivar, personally performed the evaluation and management (E/M) services for this established patient who presents today with (a) new problem(s)/exacerbation of (an) existing condition(s).  That E/M includes conducting the examination, assessing all new/exacerbated conditions, and establishing a new plan of care.  Today, my ACP, Katherin Varma NP, was here to observe my evaluation and management services for this new problem/exacerbated condition to be followed going forward.   I, Alla Keller, am scribing for an in the presence of  Dr. Gee Saldivar on this date in the following sections HISTORY OF PRESENT ILLNESS, PAST MEDICAL/FAMILY/SOCIAL HISTORY; REVIEW OF SYSTEMS; VITAL SIGNS; PHYSICAL EXAM; ASSESSMENT/PLAN.

## 2024-06-06 NOTE — PROCEDURE
[FreeTextEntry1] : Wound care provided: wound cleaned with peroxide and hibiclens soap. Surrounding skin moisturized. Wrapped with kerlix and ace

## 2024-06-06 NOTE — HISTORY OF PRESENT ILLNESS
[FreeTextEntry1] : 80 y/o F referred by Dr Ordoñez, She has a PMHx of HLD, hypercalcemia, Schamberg's, Rosacea, R GSV reflux, bilt asymptomatic varicose and spider veins, R foot ulcer with chronic bilateral edema and chronic venous stasis.    Pt presented a couple of weeks ago with worsening R ankle wound and signs of cellulitis. She was advised to be admitted for IV antibiotics, and wound care. She was admitted 5/24 until 6/3. She reports she has been cleaning her wounds with peroxide and has been elevating her legs. The visiting nurse is coming to change the wound, her first day was yesterday. Denies any fever/chills.   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  - Lives locally (within 20 blocks)

## 2024-06-07 DIAGNOSIS — I83.018 VARICOSE VEINS OF RIGHT LOWER EXTREMITY WITH ULCER OTHER PART OF LOWER LEG: ICD-10-CM

## 2024-06-07 DIAGNOSIS — E83.52 HYPERCALCEMIA: ICD-10-CM

## 2024-06-07 DIAGNOSIS — L81.7 PIGMENTED PURPURIC DERMATOSIS: ICD-10-CM

## 2024-06-07 DIAGNOSIS — L71.8 OTHER ROSACEA: ICD-10-CM

## 2024-06-07 DIAGNOSIS — E78.5 HYPERLIPIDEMIA, UNSPECIFIED: ICD-10-CM

## 2024-06-07 DIAGNOSIS — Z91.040 LATEX ALLERGY STATUS: ICD-10-CM

## 2024-06-07 DIAGNOSIS — I78.1 NEVUS, NON-NEOPLASTIC: ICD-10-CM

## 2024-06-07 DIAGNOSIS — E87.1 HYPO-OSMOLALITY AND HYPONATREMIA: ICD-10-CM

## 2024-06-07 DIAGNOSIS — E87.6 HYPOKALEMIA: ICD-10-CM

## 2024-06-07 DIAGNOSIS — L97.819 NON-PRESSURE CHRONIC ULCER OF OTHER PART OF RIGHT LOWER LEG WITH UNSPECIFIED SEVERITY: ICD-10-CM

## 2024-06-07 DIAGNOSIS — I87.2 VENOUS INSUFFICIENCY (CHRONIC) (PERIPHERAL): ICD-10-CM

## 2024-06-07 DIAGNOSIS — I10 ESSENTIAL (PRIMARY) HYPERTENSION: ICD-10-CM

## 2024-06-07 DIAGNOSIS — K21.9 GASTRO-ESOPHAGEAL REFLUX DISEASE WITHOUT ESOPHAGITIS: ICD-10-CM

## 2024-06-07 DIAGNOSIS — L03.115 CELLULITIS OF RIGHT LOWER LIMB: ICD-10-CM

## 2024-06-07 DIAGNOSIS — Z41.8 ENCOUNTER FOR OTHER PROCEDURES FOR PURPOSES OTHER THAN REMEDYING HEALTH STATE: ICD-10-CM

## 2024-06-12 ENCOUNTER — APPOINTMENT (OUTPATIENT)
Dept: VASCULAR SURGERY | Facility: CLINIC | Age: 79
End: 2024-06-12
Payer: MEDICARE

## 2024-06-12 VITALS
WEIGHT: 110 LBS | HEIGHT: 64.5 IN | HEART RATE: 98 BPM | DIASTOLIC BLOOD PRESSURE: 78 MMHG | SYSTOLIC BLOOD PRESSURE: 145 MMHG | BODY MASS INDEX: 18.55 KG/M2

## 2024-06-12 DIAGNOSIS — R60.0 LOCALIZED EDEMA: ICD-10-CM

## 2024-06-12 PROCEDURE — 99213 OFFICE O/P EST LOW 20 MIN: CPT

## 2024-06-13 NOTE — PHYSICAL EXAM
[Respiratory Effort] : normal respiratory effort [Ankle Swelling (On Exam)] : present [Ankle Swelling Bilaterally] : bilaterally  [Varicose Veins Of Lower Extremities] : present [Varicose Veins Of The Right Leg] : of the right leg [Ankle Swelling On The Right] : mild [] : bilaterally [Ankle Swelling On The Left] : moderate [Alert] : alert [Oriented to Person] : oriented to person [Oriented to Place] : oriented to place [Oriented to Time] : oriented to time [Calm] : calm [de-identified] : WN/WD [FreeTextEntry1] : Scattered spider veins on both LEs and RLE with mildly bulging varicose vein on the distal calf. Skin on both calves with mild hyperpigmentation changes c/w venous stasis discoloration and on the right side stasis dermatitis. Bilt mild edema, improved on the right calf. Superficial wound on R medial ankle, irregular borders and base with mixed granulation and small film of fibrinous tissue, mild serosang drainage on dressing, approx 2x2cm. Surrounding skin w/ no erythema and skin better moisturized.  [de-identified] : FROM [de-identified] : See cardiovascular section

## 2024-06-13 NOTE — PROCEDURE
[FreeTextEntry1] : Wound care provided: wound cleaned with peroxide and hibiclens soap. Surrounding skin moisturized. Wrapped with kerlix and ace.

## 2024-06-13 NOTE — HISTORY OF PRESENT ILLNESS
[FreeTextEntry1] : 80 y/o F referred by Dr Ordoñez, She has a PMHx of HLD, hypercalcemia, Schamberg's, Rosacea, R GSV reflux, bilt asymptomatic varicose and spider veins, R foot ulcer with chronic bilateral edema and chronic venous stasis.   Pt presented a couple of weeks ago with worsening R ankle wound and signs of cellulitis. She was advised to be admitted for IV antibiotics, and wound care. She was admitted 5/24 until 6/3. Today, she reports she has been feeling better. She says she  has been elevating her legs. The visiting nurse is coming to change the wound. She reports that when the visiting nurse does not come, she and her brother have been cleaning her wounds with peroxide. Denies any fever/chills.  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  - Lives locally ([place] 20 blocks)

## 2024-06-13 NOTE — REASON FOR VISIT
[Post Hospitalization] : a post hospitalization visit [Follow-Up: _____] : a [unfilled] follow-up visit [FreeTextEntry1] : R ankle venous ulcer, cellulitis s/p admission 5/24-6/3

## 2024-06-13 NOTE — ASSESSMENT
[Arterial/Venous Disease] : arterial/venous disease [Medication Management] : medication management [Foot care/Footwear] : foot care/footwear [Ulcer Care] : ulcer care [FreeTextEntry1] : 78 y/o F w/ R GSV reflux, bilt asymptomatic varicose and spider veins, R foot ulcer with chronic bilateral edema and chronic venous stasis. New R medial ankle large venous ulcer with mild signs of cellulitis and stasis dermatitis s/p admission 5/24-6/3.  On exam, scattered spider veins on both LEs and RLE with mildly bulging varicose vein on the distal calf. Skin on both calves with mild hyperpigmentation changes c/w venous stasis discoloration and on the right side stasis dermatitis. Bilt mild edema, improved on the right calf. Wound on R medial ankle, irregular borders and base with mixed granulation and small film of fibrinous tissue, mild serosang drainage on dressing, approx 2x2cm. Surrounding w/ no erythema and better moisturized.  Wound care provided.  Pt recommended to rest with the legs elevated above the chest level, limit standing or standing, and may continue daily walking. She was advised to continue to use peroxide to clean her wound and moisturize the skin daily. Recommend to use dry dressing and pull of the gauze when it is dry and attached to the wound. Pt to return next week.

## 2024-06-19 ENCOUNTER — APPOINTMENT (OUTPATIENT)
Dept: VASCULAR SURGERY | Facility: CLINIC | Age: 79
End: 2024-06-19
Payer: MEDICARE

## 2024-06-19 ENCOUNTER — APPOINTMENT (OUTPATIENT)
Dept: INTERNAL MEDICINE | Facility: CLINIC | Age: 79
End: 2024-06-19
Payer: MEDICARE

## 2024-06-19 ENCOUNTER — NON-APPOINTMENT (OUTPATIENT)
Age: 79
End: 2024-06-19

## 2024-06-19 VITALS
BODY MASS INDEX: 19.63 KG/M2 | SYSTOLIC BLOOD PRESSURE: 149 MMHG | HEART RATE: 79 BPM | WEIGHT: 115 LBS | DIASTOLIC BLOOD PRESSURE: 73 MMHG | HEIGHT: 64 IN

## 2024-06-19 VITALS
DIASTOLIC BLOOD PRESSURE: 70 MMHG | HEART RATE: 86 BPM | WEIGHT: 115 LBS | BODY MASS INDEX: 19.63 KG/M2 | SYSTOLIC BLOOD PRESSURE: 136 MMHG | OXYGEN SATURATION: 99 % | TEMPERATURE: 98 F | HEIGHT: 64 IN

## 2024-06-19 DIAGNOSIS — M81.0 AGE-RELATED OSTEOPOROSIS W/OUT CURRENT PATHOLOGICAL FRACTURE: ICD-10-CM

## 2024-06-19 DIAGNOSIS — U07.1 COVID-19: ICD-10-CM

## 2024-06-19 DIAGNOSIS — I87.2 VENOUS INSUFFICIENCY (CHRONIC) (PERIPHERAL): ICD-10-CM

## 2024-06-19 DIAGNOSIS — E55.9 VITAMIN D DEFICIENCY, UNSPECIFIED: ICD-10-CM

## 2024-06-19 DIAGNOSIS — L03.115 CELLULITIS OF RIGHT LOWER LIMB: ICD-10-CM

## 2024-06-19 DIAGNOSIS — R73.9 HYPERGLYCEMIA, UNSPECIFIED: ICD-10-CM

## 2024-06-19 DIAGNOSIS — E78.5 HYPERLIPIDEMIA, UNSPECIFIED: ICD-10-CM

## 2024-06-19 DIAGNOSIS — Z00.00 ENCOUNTER FOR GENERAL ADULT MEDICAL EXAMINATION W/OUT ABNORMAL FINDINGS: ICD-10-CM

## 2024-06-19 PROCEDURE — 93000 ELECTROCARDIOGRAM COMPLETE: CPT | Mod: 59

## 2024-06-19 PROCEDURE — G0439: CPT

## 2024-06-19 PROCEDURE — 99213 OFFICE O/P EST LOW 20 MIN: CPT

## 2024-06-19 PROCEDURE — 36415 COLL VENOUS BLD VENIPUNCTURE: CPT

## 2024-06-19 PROCEDURE — G0444 DEPRESSION SCREEN ANNUAL: CPT

## 2024-06-19 RX ORDER — CEPHALEXIN 250 MG/1
250 CAPSULE ORAL
Qty: 14 | Refills: 0 | Status: COMPLETED | COMMUNITY
Start: 2024-05-08 | End: 2024-06-19

## 2024-06-19 RX ORDER — ATORVASTATIN CALCIUM 20 MG/1
20 TABLET, FILM COATED ORAL
Refills: 0 | Status: COMPLETED | COMMUNITY
End: 2024-06-19

## 2024-06-19 NOTE — HEALTH RISK ASSESSMENT
[0] : 2) Feeling down, depressed, or hopeless: Not at all (0) [PHQ-2 Negative - No further assessment needed] : PHQ-2 Negative - No further assessment needed [Never] : Never [de-identified] :  I spent 5 minutes performing a depression screening on this patient.  [[id number]] : 0

## 2024-06-19 NOTE — PHYSICAL EXAM
[No Acute Distress] : no acute distress [Well-Appearing] : well-appearing [Normal Sclera/Conjunctiva] : normal sclera/conjunctiva [EOMI] : extraocular movements intact [Normal] : affect was normal and insight and judgment were intact [de-identified] : RLE dressing

## 2024-06-19 NOTE — HISTORY OF PRESENT ILLNESS
[FreeTextEntry1] : annual physical [de-identified] : Pt is a 80 y/o F with PMHx of HLD, Schalacie's, Rosacea who presents to the office today for an annual physical  Hospitalization - for 11 days at [facility] for RLE cellulitis as below: - since d/c doing well, has had daily dressing changes - has vascular f/u today May 2024 pt went to see vascular regarding worsening R ankle wound and signs of cellulitis. She was advised to be admitted for IV antibiotics, and wound care. She was admitted 5/24 until 6/3. Today, she reports she has been feeling better. She says she has been elevating her legs. The visiting nurse is coming to change the wound. She reports that when the visiting nurse does not come, she and her brother have been cleaning her wounds with peroxide. Denies any fever/chills.  HCM - Colonoscopy: needs referral - Mammo: 10/2023 - DEXA: 2/2022; needs referral - Ophthalmology: UTD - Dentist: needs referral - Derm: needs referral

## 2024-06-20 ENCOUNTER — TRANSCRIPTION ENCOUNTER (OUTPATIENT)
Age: 79
End: 2024-06-20

## 2024-06-20 LAB
25(OH)D3 SERPL-MCNC: 39.7 NG/ML
ALBUMIN SERPL ELPH-MCNC: 4.6 G/DL
ALP BLD-CCNC: 75 U/L
ALT SERPL-CCNC: 32 U/L
ANION GAP SERPL CALC-SCNC: 15 MMOL/L
APPEARANCE: CLEAR
AST SERPL-CCNC: 20 U/L
BACTERIA: NEGATIVE /HPF
BILIRUB SERPL-MCNC: 0.5 MG/DL
BILIRUBIN URINE: NEGATIVE
BLOOD URINE: NEGATIVE
BUN SERPL-MCNC: 13 MG/DL
CALCIUM SERPL-MCNC: 10 MG/DL
CAST: 0 /LPF
CHLORIDE SERPL-SCNC: 105 MMOL/L
CHOLEST SERPL-MCNC: 144 MG/DL
CO2 SERPL-SCNC: 20 MMOL/L
COLOR: YELLOW
CREAT SERPL-MCNC: 0.55 MG/DL
EGFR: 94 ML/MIN/1.73M2
EPITHELIAL CELLS: 0 /HPF
GLUCOSE QUALITATIVE U: NEGATIVE MG/DL
GLUCOSE SERPL-MCNC: 96 MG/DL
HDLC SERPL-MCNC: 69 MG/DL
KETONES URINE: NEGATIVE MG/DL
LDLC SERPL CALC-MCNC: 62 MG/DL
LEUKOCYTE ESTERASE URINE: NEGATIVE
MICROSCOPIC-UA: NORMAL
NITRITE URINE: NEGATIVE
NONHDLC SERPL-MCNC: 75 MG/DL
PH URINE: 7.5
POTASSIUM SERPL-SCNC: 4.5 MMOL/L
PROT SERPL-MCNC: 6.9 G/DL
PROTEIN URINE: NEGATIVE MG/DL
RED BLOOD CELLS URINE: 1 /HPF
SODIUM SERPL-SCNC: 140 MMOL/L
SPECIFIC GRAVITY URINE: 1.01
TRIGL SERPL-MCNC: 63 MG/DL
TSH SERPL-ACNC: 1.52 UIU/ML
UROBILINOGEN URINE: 0.2 MG/DL
WHITE BLOOD CELLS URINE: 0 /HPF

## 2024-06-20 NOTE — HISTORY OF PRESENT ILLNESS
[FreeTextEntry1] : 78 y/o F referred by Dr Ordoñez, She has a PMHx of HLD, hypercalcemia, Schamberg's, Rosacea, R GSV reflux, bilt asymptomatic varicose and spider veins, R foot ulcer with chronic bilateral edema and chronic venous stasis.   Pt presented a couple of weeks ago with worsening R ankle wound and signs of cellulitis. She was advised to be admitted for IV antibiotics, and wound care. She was admitted 5/24 until 6/3. Today, she reports she has been feeling better. She says she has been elevating her legs. The visiting nurse is coming to change the wound and new orders were started for services to continue, including AquacelAG. She reports that when the visiting nurse does not come, she and her brother have been cleaning her wounds with peroxide. Denies any fever/chills.  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  - Lives locally (within 20 blocks)

## 2024-06-20 NOTE — PROCEDURE
[FreeTextEntry1] : Wound care provided: wound cleaned with peroxide and hibiclens soap. Surrounding skin moisturized. Wound covered with AquacelAG dressing. Wrapped with kerlix and ace.

## 2024-06-20 NOTE — REASON FOR VISIT
[Follow-Up: _____] : a [unfilled] follow-up visit [FreeTextEntry1] : R ankle venous ulcer, cellulitis s/p admission 5/24-6/3

## 2024-06-20 NOTE — PHYSICAL EXAM
[Respiratory Effort] : normal respiratory effort [Ankle Swelling (On Exam)] : present [Ankle Swelling Bilaterally] : bilaterally  [Varicose Veins Of Lower Extremities] : present S1-S2 [Varicose Veins Of The Right Leg] : of the right leg [Ankle Swelling On The Right] : mild [] : bilaterally [Ankle Swelling On The Left] : moderate [Alert] : alert [Oriented to Person] : oriented to person [Oriented to Place] : oriented to place [Oriented to Time] : oriented to time [Calm] : calm [de-identified] : WN/WD [FreeTextEntry1] : Scattered spider veins on both LEs and RLE with mildly bulging varicose vein on the distal calf. Skin on both calves with mild hyperpigmentation changes c/w venous stasis discoloration and on the right side stasis dermatitis. Bilt mild edema, improved on the right calf. Superficial wound on R medial ankle, irregular borders and base with mixed granulation and small film of fibrinous tissue, mild serosang drainage on dressing, approx 2x2cm, more superficial and new skin edges. Surrounding skin w/ no erythema and skin better moisturized.  [de-identified] : FROM [de-identified] : See cardiovascular section

## 2024-06-20 NOTE — ASSESSMENT
[Arterial/Venous Disease] : arterial/venous disease [Medication Management] : medication management [Foot care/Footwear] : foot care/footwear [Ulcer Care] : ulcer care [FreeTextEntry1] : 80 y/o F w/ R GSV reflux, bilt asymptomatic varicose and spider veins, R foot ulcer with chronic bilateral edema and chronic venous stasis. New R medial ankle large venous ulcer with mild signs of cellulitis and stasis dermatitis s/p admission 5/24-6/3.  On exam, scattered spider veins on both LEs and RLE with mildly bulging varicose vein on the distal calf. Skin on both calves with mild hyperpigmentation changes c/w venous stasis discoloration and on the right side stasis dermatitis. Bilt mild edema, improved on the right calf. Superficial wound on R medial ankle, irregular borders and base with mixed granulation and small film of fibrinous tissue, mild serosang drainage on dressing, approx 2x2cm, more superficial and new skin edges. Surrounding skin w/ no erythema and skin better moisturized.   Wound care provided.  Pt recommended to rest with the legs elevated above the chest level, limit standing or standing, and may continue daily walking. She was advised to continue to use peroxide to clean her wound and moisturize the skin daily. Recommend to use dry dressing and pull of the gauze when it is dry and attached to the wound. Pt to return next week.

## 2024-06-26 ENCOUNTER — APPOINTMENT (OUTPATIENT)
Dept: VASCULAR SURGERY | Facility: CLINIC | Age: 79
End: 2024-06-26
Payer: MEDICARE

## 2024-06-26 VITALS
DIASTOLIC BLOOD PRESSURE: 78 MMHG | BODY MASS INDEX: 19.63 KG/M2 | WEIGHT: 115 LBS | HEART RATE: 81 BPM | HEIGHT: 64 IN | SYSTOLIC BLOOD PRESSURE: 145 MMHG

## 2024-06-26 DIAGNOSIS — I87.2 VENOUS INSUFFICIENCY (CHRONIC) (PERIPHERAL): ICD-10-CM

## 2024-06-26 DIAGNOSIS — I83.013 VARICOSE VEINS OF RIGHT LOWER EXTREMITY WITH ULCER OF ANKLE: ICD-10-CM

## 2024-06-26 DIAGNOSIS — I87.8 OTHER SPECIFIED DISORDERS OF VEINS: ICD-10-CM

## 2024-06-26 DIAGNOSIS — L97.319 VARICOSE VEINS OF RIGHT LOWER EXTREMITY WITH ULCER OF ANKLE: ICD-10-CM

## 2024-06-26 PROCEDURE — 99213 OFFICE O/P EST LOW 20 MIN: CPT

## 2024-07-19 ENCOUNTER — APPOINTMENT (OUTPATIENT)
Dept: VASCULAR SURGERY | Facility: CLINIC | Age: 79
End: 2024-07-19
Payer: MEDICARE

## 2024-07-19 VITALS
WEIGHT: 115 LBS | DIASTOLIC BLOOD PRESSURE: 76 MMHG | SYSTOLIC BLOOD PRESSURE: 159 MMHG | BODY MASS INDEX: 19.63 KG/M2 | HEIGHT: 64 IN | HEART RATE: 97 BPM

## 2024-07-19 DIAGNOSIS — I87.2 VENOUS INSUFFICIENCY (CHRONIC) (PERIPHERAL): ICD-10-CM

## 2024-07-19 DIAGNOSIS — I83.013 VARICOSE VEINS OF RIGHT LOWER EXTREMITY WITH ULCER OF ANKLE: ICD-10-CM

## 2024-07-19 DIAGNOSIS — L97.319 VARICOSE VEINS OF RIGHT LOWER EXTREMITY WITH ULCER OF ANKLE: ICD-10-CM

## 2024-07-19 DIAGNOSIS — R60.0 LOCALIZED EDEMA: ICD-10-CM

## 2024-07-19 PROCEDURE — 99213 OFFICE O/P EST LOW 20 MIN: CPT

## 2024-08-06 ENCOUNTER — APPOINTMENT (OUTPATIENT)
Dept: HEART AND VASCULAR | Facility: CLINIC | Age: 79
End: 2024-08-06

## 2024-08-06 PROCEDURE — 99213 OFFICE O/P EST LOW 20 MIN: CPT

## 2024-08-07 NOTE — REASON FOR VISIT
[Home] : at home, [unfilled] , at the time of the visit. [Medical Office: ([facility])___] : at the medical office located in  [Patient] : the patient [Structural Heart and Valve Disease] : structural heart and valve disease

## 2024-08-07 NOTE — DISCUSSION/SUMMARY
[FreeTextEntry1] : The patient is seeing vascular surgery for her leg ulcer.  She reports her blood pressure is normal.  When the nurse comes next time she will call back with the exact numbers.  Her June EKG showed the T wave inversion in V2 which was a minor change.  Her recent LDL was 62.  In April of this year her venous showed no deep vein thrombosis.  In January it showed reflux.  She will elevate her legs.  She will continue atorvastatin.

## 2024-08-07 NOTE — HISTORY OF PRESENT ILLNESS
[FreeTextEntry1] : The patient has a leg ulcer.  Her blood pressure has been normal at the doctor.  She does not have edema.  She denies chest pain or dyspnea.

## 2024-09-18 ENCOUNTER — APPOINTMENT (OUTPATIENT)
Dept: VASCULAR SURGERY | Facility: CLINIC | Age: 79
End: 2024-09-18
Payer: MEDICARE

## 2024-09-18 VITALS
BODY MASS INDEX: 19.63 KG/M2 | HEIGHT: 64 IN | SYSTOLIC BLOOD PRESSURE: 156 MMHG | DIASTOLIC BLOOD PRESSURE: 78 MMHG | WEIGHT: 115 LBS | HEART RATE: 94 BPM

## 2024-09-18 DIAGNOSIS — I83.013 VARICOSE VEINS OF RIGHT LOWER EXTREMITY WITH ULCER OF ANKLE: ICD-10-CM

## 2024-09-18 DIAGNOSIS — L97.319 VARICOSE VEINS OF RIGHT LOWER EXTREMITY WITH ULCER OF ANKLE: ICD-10-CM

## 2024-09-18 DIAGNOSIS — I87.2 VENOUS INSUFFICIENCY (CHRONIC) (PERIPHERAL): ICD-10-CM

## 2024-09-18 PROCEDURE — 99213 OFFICE O/P EST LOW 20 MIN: CPT

## 2024-09-19 NOTE — PHYSICAL EXAM
[Respiratory Effort] : normal respiratory effort [Ankle Swelling (On Exam)] : present [Ankle Swelling Bilaterally] : bilaterally  [Varicose Veins Of Lower Extremities] : present [Varicose Veins Of The Right Leg] : of the right leg [Ankle Swelling On The Right] : mild [] : bilaterally [Ankle Swelling On The Left] : moderate [Alert] : alert [Oriented to Person] : oriented to person [Oriented to Place] : oriented to place [Oriented to Time] : oriented to time [Calm] : calm [de-identified] : WN/WD [FreeTextEntry1] : Scattered spider veins on both LEs and RLE with mildly bulging varicose vein on the distal calf. Skin on both calves with mild hyperpigmentation changes c/w venous stasis discoloration. Bilt mild edema. R medial ankle woudn healed with small dry callus/scab. [de-identified] : FROM [de-identified] : See cardiovascular section

## 2024-09-19 NOTE — PHYSICAL EXAM
[Respiratory Effort] : normal respiratory effort [Ankle Swelling (On Exam)] : present [Ankle Swelling Bilaterally] : bilaterally  [Varicose Veins Of Lower Extremities] : present [Varicose Veins Of The Right Leg] : of the right leg [Ankle Swelling On The Right] : mild [] : bilaterally [Ankle Swelling On The Left] : moderate [Alert] : alert [Oriented to Person] : oriented to person [Oriented to Place] : oriented to place [Oriented to Time] : oriented to time [Calm] : calm [de-identified] : WN/WD [FreeTextEntry1] : Scattered spider veins on both LEs and RLE with mildly bulging varicose vein on the distal calf. Skin on both calves with mild hyperpigmentation changes c/w venous stasis discoloration. Bilt mild edema. R medial ankle woudn healed with small dry callus/scab. [de-identified] : FROM [de-identified] : See cardiovascular section

## 2024-09-19 NOTE — ASSESSMENT
[Arterial/Venous Disease] : arterial/venous disease [Medication Management] : medication management [Foot care/Footwear] : foot care/footwear [Ulcer Care] : ulcer care [FreeTextEntry1] : 78 y/o F w/ R GSV reflux, bilt asymptomatic varicose and spider veins, R foot ulcer with chronic bilateral edema and chronic venous stasis. New R medial ankle large venous ulcer with mild signs of cellulitis and stasis dermatitis s/p admission 5/24-6/3. Now, wound healed.   On exam, scattered spider veins on both LEs and RLE with mildly bulging varicose vein on the distal calf. Skin on both calves with mild hyperpigmentation changes c/w venous stasis discoloration. Bilt mild edema. R medial ankle wound healed with small dry callus/scab.  Pt recommended to elevate legs while resting, walking encouraged but avoid sitting or standing for long periods of time, continue daily use of compressions stockings and moisturizing. Pt to return prn

## 2024-09-19 NOTE — HISTORY OF PRESENT ILLNESS
[FreeTextEntry1] : 78 y/o F referred by Dr Ordoñez, She has a PMHx of HLD, hypercalcemia, Schamberg's, Rosacea, R GSV reflux, bilt asymptomatic varicose and spider veins, R foot ulcer with chronic bilateral edema and chronic venous stasis. Pt presented with worsening R ankle wound and signs of cellulitis. She was advised to be admitted for IV antibiotics, and wound care. She was admitted 5/24 until 6/3. Since then, she has been providing local wound care and [facility] has been keeping us updated with pictures.   Today, she reports the wound has healed and wanted to check-in with us. She is very happy and has been keeping the skin moisturized and uses the daily compression stockings. Denies any fever/chills.  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  - Lives locally (within 20 blocks)

## 2024-09-19 NOTE — PHYSICAL EXAM
[Respiratory Effort] : normal respiratory effort [Ankle Swelling (On Exam)] : present [Ankle Swelling Bilaterally] : bilaterally  [Varicose Veins Of Lower Extremities] : present [Varicose Veins Of The Right Leg] : of the right leg [Ankle Swelling On The Right] : mild [] : bilaterally [Ankle Swelling On The Left] : moderate [Alert] : alert [Oriented to Person] : oriented to person [Oriented to Place] : oriented to place [Oriented to Time] : oriented to time [Calm] : calm [de-identified] : WN/WD [FreeTextEntry1] : Scattered spider veins on both LEs and RLE with mildly bulging varicose vein on the distal calf. Skin on both calves with mild hyperpigmentation changes c/w venous stasis discoloration. Bilt mild edema. R medial ankle woudn healed with small dry callus/scab. [de-identified] : FROM [de-identified] : See cardiovascular section

## 2024-09-19 NOTE — ASSESSMENT
[Arterial/Venous Disease] : arterial/venous disease [Medication Management] : medication management [Foot care/Footwear] : foot care/footwear [Ulcer Care] : ulcer care [FreeTextEntry1] : 80 y/o F w/ R GSV reflux, bilt asymptomatic varicose and spider veins, R foot ulcer with chronic bilateral edema and chronic venous stasis. New R medial ankle large venous ulcer with mild signs of cellulitis and stasis dermatitis s/p admission 5/24-6/3. Now, wound healed.   On exam, scattered spider veins on both LEs and RLE with mildly bulging varicose vein on the distal calf. Skin on both calves with mild hyperpigmentation changes c/w venous stasis discoloration. Bilt mild edema. R medial ankle wound healed with small dry callus/scab.  Pt recommended to elevate legs while resting, walking encouraged but avoid sitting or standing for long periods of time, continue daily use of compressions stockings and moisturizing. Pt to return prn

## 2024-09-27 ENCOUNTER — APPOINTMENT (OUTPATIENT)
Dept: INTERNAL MEDICINE | Facility: CLINIC | Age: 79
End: 2024-09-27
Payer: MEDICARE

## 2024-09-27 DIAGNOSIS — L50.9 URTICARIA, UNSPECIFIED: ICD-10-CM

## 2024-09-27 DIAGNOSIS — L23.9 ALLERGIC CONTACT DERMATITIS, UNSPECIFIED CAUSE: ICD-10-CM

## 2024-09-27 PROCEDURE — 99442: CPT | Mod: 93

## 2024-09-27 RX ORDER — FAMOTIDINE 20 MG/1
20 TABLET, FILM COATED ORAL
Qty: 28 | Refills: 0 | Status: ACTIVE | COMMUNITY
Start: 2024-09-27 | End: 1900-01-01

## 2024-09-27 RX ORDER — CETIRIZINE HYDROCHLORIDE 10 MG/1
10 TABLET, FILM COATED ORAL DAILY
Qty: 14 | Refills: 0 | Status: ACTIVE | COMMUNITY
Start: 2024-09-27 | End: 1900-01-01

## 2024-10-28 ENCOUNTER — APPOINTMENT (OUTPATIENT)
Dept: INTERNAL MEDICINE | Facility: CLINIC | Age: 79
End: 2024-10-28
Payer: MEDICARE

## 2024-10-28 VITALS
HEIGHT: 64 IN | SYSTOLIC BLOOD PRESSURE: 146 MMHG | DIASTOLIC BLOOD PRESSURE: 75 MMHG | TEMPERATURE: 97.7 F | RESPIRATION RATE: 18 BRPM | OXYGEN SATURATION: 99 % | HEART RATE: 98 BPM

## 2024-10-28 DIAGNOSIS — L23.9 ALLERGIC CONTACT DERMATITIS, UNSPECIFIED CAUSE: ICD-10-CM

## 2024-10-28 PROCEDURE — G2211 COMPLEX E/M VISIT ADD ON: CPT

## 2024-10-28 PROCEDURE — 99213 OFFICE O/P EST LOW 20 MIN: CPT

## 2024-11-15 ENCOUNTER — APPOINTMENT (OUTPATIENT)
Dept: HEART AND VASCULAR | Facility: CLINIC | Age: 79
End: 2024-11-15
Payer: MEDICARE

## 2024-11-15 VITALS
DIASTOLIC BLOOD PRESSURE: 75 MMHG | TEMPERATURE: 97.5 F | WEIGHT: 114 LBS | OXYGEN SATURATION: 98 % | SYSTOLIC BLOOD PRESSURE: 158 MMHG | HEIGHT: 64 IN | BODY MASS INDEX: 19.46 KG/M2 | HEART RATE: 93 BPM

## 2024-11-15 VITALS — SYSTOLIC BLOOD PRESSURE: 132 MMHG | DIASTOLIC BLOOD PRESSURE: 74 MMHG

## 2024-11-15 DIAGNOSIS — I34.1 NONRHEUMATIC MITRAL (VALVE) PROLAPSE: ICD-10-CM

## 2024-11-15 DIAGNOSIS — I87.2 VENOUS INSUFFICIENCY (CHRONIC) (PERIPHERAL): ICD-10-CM

## 2024-11-15 DIAGNOSIS — E78.5 HYPERLIPIDEMIA, UNSPECIFIED: ICD-10-CM

## 2024-11-15 DIAGNOSIS — R94.31 ABNORMAL ELECTROCARDIOGRAM [ECG] [EKG]: ICD-10-CM

## 2024-11-15 PROCEDURE — 93000 ELECTROCARDIOGRAM COMPLETE: CPT

## 2024-11-15 PROCEDURE — ZZZZZ: CPT | Mod: NC

## 2024-11-15 PROCEDURE — 99214 OFFICE O/P EST MOD 30 MIN: CPT | Mod: 25

## 2024-11-15 PROCEDURE — 93306 TTE W/DOPPLER COMPLETE: CPT

## 2024-12-11 DIAGNOSIS — M21.619 BUNION OF UNSPECIFIED FOOT: ICD-10-CM

## 2025-01-10 ENCOUNTER — APPOINTMENT (OUTPATIENT)
Dept: DERMATOLOGY | Facility: CLINIC | Age: 80
End: 2025-01-10
Payer: MEDICARE

## 2025-01-10 DIAGNOSIS — L30.9 DERMATITIS, UNSPECIFIED: ICD-10-CM

## 2025-01-10 PROCEDURE — 99204 OFFICE O/P NEW MOD 45 MIN: CPT

## 2025-01-15 ENCOUNTER — RX RENEWAL (OUTPATIENT)
Age: 80
End: 2025-01-15

## 2025-01-15 RX ORDER — PIMECROLIMUS 10 MG/G
1 CREAM TOPICAL
Qty: 1 | Refills: 3 | Status: ACTIVE | COMMUNITY
Start: 2025-01-10 | End: 1900-01-01

## 2025-02-24 ENCOUNTER — APPOINTMENT (OUTPATIENT)
Dept: VASCULAR SURGERY | Facility: CLINIC | Age: 80
End: 2025-02-24

## 2025-02-24 DIAGNOSIS — R60.0 LOCALIZED EDEMA: ICD-10-CM

## 2025-02-24 DIAGNOSIS — I87.8 OTHER SPECIFIED DISORDERS OF VEINS: ICD-10-CM

## 2025-02-24 DIAGNOSIS — I87.2 VENOUS INSUFFICIENCY (CHRONIC) (PERIPHERAL): ICD-10-CM

## 2025-02-24 PROCEDURE — 99213 OFFICE O/P EST LOW 20 MIN: CPT

## 2025-03-05 ENCOUNTER — APPOINTMENT (OUTPATIENT)
Dept: VASCULAR SURGERY | Facility: CLINIC | Age: 80
End: 2025-03-05
Payer: MEDICARE

## 2025-03-05 ENCOUNTER — NON-APPOINTMENT (OUTPATIENT)
Age: 80
End: 2025-03-05

## 2025-03-05 VITALS
DIASTOLIC BLOOD PRESSURE: 69 MMHG | HEART RATE: 108 BPM | HEIGHT: 64 IN | BODY MASS INDEX: 19.46 KG/M2 | SYSTOLIC BLOOD PRESSURE: 167 MMHG | WEIGHT: 114 LBS

## 2025-03-05 DIAGNOSIS — L30.9 DERMATITIS, UNSPECIFIED: ICD-10-CM

## 2025-03-05 PROCEDURE — 99213 OFFICE O/P EST LOW 20 MIN: CPT

## 2025-04-18 ENCOUNTER — APPOINTMENT (OUTPATIENT)
Dept: DERMATOLOGY | Facility: CLINIC | Age: 80
End: 2025-04-18
Payer: MEDICARE

## 2025-04-18 DIAGNOSIS — L25.9 UNSPECIFIED CONTACT DERMATITIS, UNSPECIFIED CAUSE: ICD-10-CM

## 2025-04-18 DIAGNOSIS — L81.0 POSTINFLAMMATORY HYPERPIGMENTATION: ICD-10-CM

## 2025-04-18 PROCEDURE — 99213 OFFICE O/P EST LOW 20 MIN: CPT

## 2025-06-23 ENCOUNTER — APPOINTMENT (OUTPATIENT)
Dept: INTERNAL MEDICINE | Facility: CLINIC | Age: 80
End: 2025-06-23
Payer: MEDICARE

## 2025-06-23 VITALS — SYSTOLIC BLOOD PRESSURE: 114 MMHG | DIASTOLIC BLOOD PRESSURE: 72 MMHG

## 2025-06-23 VITALS
HEART RATE: 90 BPM | HEIGHT: 64 IN | TEMPERATURE: 97.7 F | WEIGHT: 106.25 LBS | BODY MASS INDEX: 18.14 KG/M2 | DIASTOLIC BLOOD PRESSURE: 69 MMHG | SYSTOLIC BLOOD PRESSURE: 146 MMHG | OXYGEN SATURATION: 98 %

## 2025-06-23 PROBLEM — L98.499 INFECTED ULCER OF SKIN: Status: RESOLVED | Noted: 2024-05-24 | Resolved: 2025-06-23

## 2025-06-23 PROBLEM — Z12.11 COLON CANCER SCREENING: Status: ACTIVE | Noted: 2025-06-23

## 2025-06-23 PROBLEM — L03.115 CELLULITIS OF RIGHT LOWER EXTREMITY: Status: RESOLVED | Noted: 2024-05-08 | Resolved: 2025-06-23

## 2025-06-23 PROCEDURE — 99213 OFFICE O/P EST LOW 20 MIN: CPT | Mod: 25

## 2025-06-23 PROCEDURE — 36415 COLL VENOUS BLD VENIPUNCTURE: CPT

## 2025-06-23 PROCEDURE — G2211 COMPLEX E/M VISIT ADD ON: CPT | Mod: NC

## 2025-06-23 PROCEDURE — G0439: CPT

## 2025-06-23 RX ORDER — PNEUMOCOCCAL 20-VALENT CONJUGATE VACCINE 2.2; 2.2; 2.2; 2.2; 2.2; 2.2; 2.2; 2.2; 2.2; 2.2; 2.2; 2.2; 2.2; 2.2; 2.2; 2.2; 4.4; 2.2; 2.2; 2.2 UG/.5ML; UG/.5ML; UG/.5ML; UG/.5ML; UG/.5ML; UG/.5ML; UG/.5ML; UG/.5ML; UG/.5ML; UG/.5ML; UG/.5ML; UG/.5ML; UG/.5ML; UG/.5ML; UG/.5ML; UG/.5ML; UG/.5ML; UG/.5ML; UG/.5ML; UG/.5ML
INJECTION, SUSPENSION INTRAMUSCULAR
Qty: 1 | Refills: 0 | Status: ACTIVE | OUTPATIENT
Start: 2025-06-23

## 2025-06-23 RX ORDER — RESPIRATORY SYNCYTIAL VISUS VACCINE RECOMBINANT, ADJUVANTED 120MCG/0.5
120 KIT INTRAMUSCULAR
Qty: 1 | Refills: 0 | Status: ACTIVE | OUTPATIENT
Start: 2025-06-23

## 2025-06-24 LAB
25(OH)D3 SERPL-MCNC: 56.8 NG/ML
ALBUMIN SERPL ELPH-MCNC: 4.8 G/DL
ALP BLD-CCNC: 83 U/L
ALT SERPL-CCNC: 27 U/L
ANION GAP SERPL CALC-SCNC: 17 MMOL/L
APPEARANCE: CLEAR
AST SERPL-CCNC: 24 U/L
BACTERIA: NEGATIVE /HPF
BASOPHILS # BLD AUTO: 0.06 K/UL
BASOPHILS NFR BLD AUTO: 0.9 %
BILIRUB SERPL-MCNC: 0.6 MG/DL
BILIRUBIN URINE: NEGATIVE
BLOOD URINE: NEGATIVE
BUN SERPL-MCNC: 14 MG/DL
CALCIUM SERPL-MCNC: 10.2 MG/DL
CAST: 0 /LPF
CHLORIDE SERPL-SCNC: 104 MMOL/L
CHOLEST SERPL-MCNC: 147 MG/DL
CO2 SERPL-SCNC: 20 MMOL/L
COLOR: YELLOW
CREAT SERPL-MCNC: 0.59 MG/DL
EGFRCR SERPLBLD CKD-EPI 2021: 92 ML/MIN/1.73M2
EOSINOPHIL # BLD AUTO: 0.08 K/UL
EOSINOPHIL NFR BLD AUTO: 1.2 %
EPITHELIAL CELLS: 0 /HPF
ESTIMATED AVERAGE GLUCOSE: 111 MG/DL
GLUCOSE QUALITATIVE U: NEGATIVE MG/DL
GLUCOSE SERPL-MCNC: 100 MG/DL
HBA1C MFR BLD HPLC: 5.5 %
HCT VFR BLD CALC: 39 %
HDLC SERPL-MCNC: 79 MG/DL
HGB BLD-MCNC: 12.2 G/DL
IMM GRANULOCYTES NFR BLD AUTO: 0.3 %
KETONES URINE: NEGATIVE MG/DL
LDLC SERPL-MCNC: 57 MG/DL
LEUKOCYTE ESTERASE URINE: NEGATIVE
LYMPHOCYTES # BLD AUTO: 1.13 K/UL
LYMPHOCYTES NFR BLD AUTO: 16.5 %
MAN DIFF?: NORMAL
MCHC RBC-ENTMCNC: 30.3 PG
MCHC RBC-ENTMCNC: 31.3 G/DL
MCV RBC AUTO: 96.8 FL
MICROSCOPIC-UA: NORMAL
MONOCYTES # BLD AUTO: 0.4 K/UL
MONOCYTES NFR BLD AUTO: 5.8 %
NEUTROPHILS # BLD AUTO: 5.15 K/UL
NEUTROPHILS NFR BLD AUTO: 75.3 %
NITRITE URINE: NEGATIVE
NONHDLC SERPL-MCNC: 68 MG/DL
PH URINE: 7
PLATELET # BLD AUTO: 178 K/UL
POTASSIUM SERPL-SCNC: 4.3 MMOL/L
PROT SERPL-MCNC: 6.9 G/DL
PROTEIN URINE: NEGATIVE MG/DL
RBC # BLD: 4.03 M/UL
RBC # FLD: 13.3 %
RED BLOOD CELLS URINE: 1 /HPF
SODIUM SERPL-SCNC: 141 MMOL/L
SPECIFIC GRAVITY URINE: 1.01
TRIGL SERPL-MCNC: 53 MG/DL
TSH SERPL-ACNC: 1.64 UIU/ML
UROBILINOGEN URINE: 0.2 MG/DL
WBC # FLD AUTO: 6.84 K/UL
WHITE BLOOD CELLS URINE: 0 /HPF

## 2025-09-05 ENCOUNTER — APPOINTMENT (OUTPATIENT)
Dept: HEART AND VASCULAR | Facility: CLINIC | Age: 80
End: 2025-09-05